# Patient Record
Sex: FEMALE | Race: BLACK OR AFRICAN AMERICAN | NOT HISPANIC OR LATINO | Employment: OTHER | URBAN - METROPOLITAN AREA
[De-identification: names, ages, dates, MRNs, and addresses within clinical notes are randomized per-mention and may not be internally consistent; named-entity substitution may affect disease eponyms.]

---

## 2017-01-09 ENCOUNTER — OFFICE VISIT (OUTPATIENT)
Dept: OBSTETRICS AND GYNECOLOGY | Facility: CLINIC | Age: 62
End: 2017-01-09
Payer: MEDICARE

## 2017-01-09 VITALS
BODY MASS INDEX: 26.36 KG/M2 | SYSTOLIC BLOOD PRESSURE: 132 MMHG | DIASTOLIC BLOOD PRESSURE: 78 MMHG | WEIGHT: 144.13 LBS

## 2017-01-09 DIAGNOSIS — N76.0 ACUTE VAGINITIS: ICD-10-CM

## 2017-01-09 DIAGNOSIS — Z78.0 POSTMENOPAUSAL STATUS: ICD-10-CM

## 2017-01-09 DIAGNOSIS — Z76.82 KIDNEY TRANSPLANT CANDIDATE: ICD-10-CM

## 2017-01-09 DIAGNOSIS — Z01.419 WELL WOMAN EXAM WITH ROUTINE GYNECOLOGICAL EXAM: Primary | ICD-10-CM

## 2017-01-09 DIAGNOSIS — Z12.4 PAP SMEAR FOR CERVICAL CANCER SCREENING: ICD-10-CM

## 2017-01-09 DIAGNOSIS — Z85.3 PERSONAL HISTORY OF BREAST CANCER: ICD-10-CM

## 2017-01-09 DIAGNOSIS — Z90.710 HISTORY OF HYSTERECTOMY: ICD-10-CM

## 2017-01-09 PROCEDURE — G0101 CA SCREEN;PELVIC/BREAST EXAM: HCPCS | Mod: S$PBB,,, | Performed by: OBSTETRICS & GYNECOLOGY

## 2017-01-09 PROCEDURE — 88175 CYTOPATH C/V AUTO FLUID REDO: CPT

## 2017-01-09 PROCEDURE — G0101 CA SCREEN;PELVIC/BREAST EXAM: HCPCS | Mod: PBBFAC | Performed by: OBSTETRICS & GYNECOLOGY

## 2017-01-09 PROCEDURE — 99999 PR PBB SHADOW E&M-EST. PATIENT-LVL II: CPT | Mod: PBBFAC,,, | Performed by: OBSTETRICS & GYNECOLOGY

## 2017-01-09 PROCEDURE — 87480 CANDIDA DNA DIR PROBE: CPT

## 2017-01-09 PROCEDURE — 99212 OFFICE O/P EST SF 10 MIN: CPT | Mod: PBBFAC,25 | Performed by: OBSTETRICS & GYNECOLOGY

## 2017-01-09 NOTE — PROGRESS NOTES
Archana Ward is a 61 y.o. year old  who presents for annual exam.  She is S/P JULIANA / BSO.  No bleeding.  No on ERT.  Reports having increased discharge off and on with itching, irritation.  She has a history of left DCIS, S/P left partial mastectomy without sentinel node biopsy on 2015 and subsequent radiation.  She has ESRD on dialysis and is a kidney transplant candidate.      Past Medical History   Diagnosis Date    Anemia of chronic renal failure 2014    Breast cancer     ESRD 2/2 HTN on HD since 11    Hypertension - Dx in 30s 2014    Kidney transplant candidate 2014    Secondary hyperparathyroidism of renal origin 2014       Past Surgical History   Procedure Laterality Date    Cholecystectomy      Av fistula placement      Hysterectomy         OB History      Para Term  AB TAB SAB Ectopic Multiple Living    6 6 6               Obstetric Comments    Normal delivery for all 6 pregnancies  No h/o gestational diabetes or preeclampsia          ROS:  GENERAL: Feeling well overall.   SKIN: Denies rash or lesions.   HEAD: Denies head injury or headache.   NODES: Denies enlarged lymph nodes.   CHEST: Denies chest pain or shortness of breath.   CARDIOVASCULAR: Denies palpitations or left sided chest pain.   ABDOMEN: No abdominal pain.  Reports some constipation.   URINARY: No dysuria or hematuria.  REPRODUCTIVE: See HPI.   BREASTS: Denies pain, lumps, or nipple discharge.   HEMATOLOGIC: No easy bruisability or excessive bleeding.   MUSCULOSKELETAL: Reports some discomfort in knees.  NEUROLOGIC: Denies syncope or weakness.   PSYCHIATRIC: Denies depression.    PE:  (chaperone present during entire exam)  APPEARANCE: Well nourished, well developed, in no acute distress.  NODES: No inguinal lymph node enlargement.  ABDOMEN: Soft. No tenderness or masses. No hernias.  BREASTS: Symmetrical.  Scarring left breast.  No palpable masses,  nipple discharge or adenopathy bilaterally.  PELVIC: Atrophic external female genitalia without lesions. Normal hair distribution. Adequate perineal body, normal urethral meatus. Vagina atrophic without lesions. Mild amount of thin white discharge. Mild cystocele and rectocele. Uterus and cervix surgically absent. Bimanual exam revealed no masses, tenderness or abnormality.  ANUS: Normal.    Diagnosis:  1. Well woman exam with routine gynecological exam    2. Postmenopausal status    3. History of hysterectomy    4. Kidney transplant candidate    5. Pap smear for cervical cancer screening    6. Acute vaginitis    7. Personal history of breast cancer          PLAN:    Orders Placed This Encounter    Vaginosis Screen by DNA Probe    Liquid-based pap smear, screening       Patient was counseled today on postmenopausal issues.  We discussed vaginitis and will contact her with results of the Affirm.  Mammogram 6/2017.     Follow-up in 1 year.

## 2017-01-10 ENCOUNTER — TELEPHONE (OUTPATIENT)
Dept: OBSTETRICS AND GYNECOLOGY | Facility: CLINIC | Age: 62
End: 2017-01-10

## 2017-01-10 LAB
CANDIDA RRNA VAG QL PROBE: NEGATIVE
G VAGINALIS RRNA GENITAL QL PROBE: POSITIVE
T VAGINALIS RRNA GENITAL QL PROBE: NEGATIVE

## 2017-01-10 RX ORDER — METRONIDAZOLE 500 MG/1
500 TABLET ORAL 2 TIMES DAILY
Qty: 14 TABLET | Refills: 0 | Status: SHIPPED | OUTPATIENT
Start: 2017-01-10 | End: 2017-01-17

## 2017-01-10 NOTE — TELEPHONE ENCOUNTER
Called patient:    Discussed results of Affirm:  +BV    Rx: Flagyl 500 mg bid x 7 days.  No alcohol.    To call if not resolved.

## 2017-02-14 ENCOUNTER — OFFICE VISIT (OUTPATIENT)
Dept: PSYCHIATRY | Facility: CLINIC | Age: 62
End: 2017-02-14
Payer: MEDICARE

## 2017-02-14 DIAGNOSIS — Z76.82 ORGAN TRANSPLANT CANDIDATE: ICD-10-CM

## 2017-02-14 DIAGNOSIS — R41.3 MEMORY LOSS: ICD-10-CM

## 2017-02-14 DIAGNOSIS — N18.6 ESRD (END STAGE RENAL DISEASE): ICD-10-CM

## 2017-02-14 DIAGNOSIS — R41.89 COGNITIVE IMPAIRMENT: Primary | ICD-10-CM

## 2017-02-14 PROCEDURE — 96119 PR NEUROPSYCH TESTING BY TECHNICIAN: CPT | Mod: 59,S$PBB,, | Performed by: PSYCHOLOGIST

## 2017-02-14 PROCEDURE — 90791 PSYCH DIAGNOSTIC EVALUATION: CPT | Mod: S$PBB,,, | Performed by: PSYCHOLOGIST

## 2017-02-14 PROCEDURE — 96118 PR NEUROPSYCH TESTING BY PSYCH/PHYS: CPT | Mod: 59,S$PBB,, | Performed by: PSYCHOLOGIST

## 2017-02-20 ENCOUNTER — TELEPHONE (OUTPATIENT)
Dept: PSYCHIATRY | Facility: CLINIC | Age: 62
End: 2017-02-20

## 2017-02-20 NOTE — PROGRESS NOTES
Psychiatry Initial Visit (PhD/LCSW)    Date:   2/14/2017               CPT Code: 98329    Referred by: Laureen Dill NP    Chief complaint/reason for encounter:  Neuropsychological Evaluation    Clinical status of patient:  Outpatient    Met with:  Patient    History of present illness: Mrs. Archana Ward is a 61 year old separate  female referred for Neuropsychological Evaluation on an outpatient basis as part of the evaluation prior to kidney transplant. She reported to the kidney transplant staff that sometimes she does not take her medication because of memory difficulty, prompting this evaluation. She reported that subjective memory decline has been present for a few months. She reported that she will be doing something, then forget what she as doing. She reported she has trouble with her memory sometimes but not too much. Upon direct questioning, she also reported that she misplaces personal items in the home; forgets conversations and events; repeats herself per her daughter; forgets what she reads; has left food cooking on the stove; has forgotten to pay bills; and loses her train of thought in conversation. She does not drive as she never learned how. She reported she manages her own medications and finances adequately although she will forget a medication or a bill now and then. She has no difficulty with household chores. She reported her memory seems to be staying the same over time. No precipitant could be identified. There is no family history of memory problems/dementia. Mrs. Ward has no prior psychiatric history. She also denied current adjustment problems. She was pleasant and cooperative in interview. She has been on dialysis for about 4 years and would very much like to get a transplant.    Pain scale: noncontributory    Symptoms:  Mood: denied  Anxiety:  denied  Substance abuse: denied  Cognitive functioning:  memory decline    Psychiatric history: none    Medical history:  memory loss, organ transplant candidate, ESRD, anemia of chronic renal failure, ductal carcinoma in situ, and essential hypertension. No brain/head imaging was available.    Family history of psychiatric illness: none    Social history (marriage, employment, etc.):  10th grade education. 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  once.  for 40 years. 6 children. Lives with a daughter and her 3 sons. [employer]. Active in Buddhism. Enjoys watching TV, cooking for grandchildren.      Substance use:   Alcohol: no   Drugs: no   Tobacco: no   Caffeine: no    Strengths and Liabilities:   Strength:  Pt is expressive/articulate.   Liability:  Pt has subjective memory loss.    Mental Status Exam:  General appearance:  appears stated age, neatly dressed, well groomed  Speech:  normal rate and tone  Level of cooperation:  cooperative  Thought processes:  logical, goal-directed  Mood:  euthymic  Thought content:  no illusions, no visual hallucinations, no auditory hallucinations, no delusions, no active or passive homicidal thoughts, no active or passive suicidal ideation, no obsessions, no compulsions, no violence  Affect:  appropriate  Orientation:  oriented to person, place, and time  Memory:  Recent memory:  2 of 3 objects after brief delay.    Remote memory - intact  Attention span and concentration:  spelled HOUSE forward and backwards  Fund of general knowledge: 4 of 4 recent presidents  Abstract reasoning:    Similarities: concrete  Proverbs: dont know  Judgment and insight: fair  Language:  intact    Diagnostic impressions:  Cognitive impairment R41.89  Memory loss R41.3  ESRD N18.6  Organ transplant candidate Z76.82    Plan:  Pt will complete Neuropsychological testing.  Report of Neuropsychological Evaluation will follow. It can be accessed through the Chart Review activity in Epic under the Notes tab.  It will be titled Psych Testing.    Return to clinic:  as  scheduled    Length of time:  40 minutes

## 2017-02-20 NOTE — PSYCH TESTING
[facility] [address] [place]  [address]  [phone]    REPORT OF NEUROPSYCHOLOGICAL EVALUATION    NAME: Archana Ward  OC #: [id number]  : 1955    REFERRED BY: Laureen Dill NP    EVALUATED BY:  Dolores Powers, Ph.D., Clinical Psychologist  Parish Urbina M.S., Psychometrician    DATE OF EVALUATION: 2017    EVALUATION PROCEDURES AND TIME:  Conducted by Psychologist:  Interpretation and report of test data  Review and integration of diagnostic interview, medical record, and test data  Conducted by Technician:  Repeatable Battery for the Assessment of Neuropsychological Status (RBANS)  Temporal Orientation Test  Naming Test from the Neuropsychological Assessment Battery (NAB)  Controlled Oral Word Association Test  Facial Recognition Test  Guthrie Visual Motor Gestalt Test  Wechsler Memory Scale IV Logical Memory & Visual Reproduction Battery (WMS-IV LMVR)  Wisconsin Card Sorting Test - 64 (WCST-64)  Trail Making Test, Parts A & B  Time: CPT Code 80778 - 2 hours; CPT Code 50718 - 2 hours    EVALUATION FINDINGS:  The diagnostic interview revealed Mrs. Archana Ward is a 61 year old right-handed -American female referred for Neuropsychological Evaluation on an outpatient basis as part of the evaluation prior to kidney transplant. She reported to the kidney transplant staff that sometimes she does not take her medication because of memory difficulty, prompting this evaluation. She reported that subjective memory decline has been present for a few months. She reported that she will be doing something, then forget what she as doing. She reported she has trouble with her memory sometimes but not too much. Upon direct questioning, she also reported that she misplaces personal items in the home; forgets conversations and events; repeats herself per her daughter; forgets what she reads; has left food cooking on the stove; has forgotten to pay bills; and loses her  train of thought in conversation. She does not drive as she never learned how. She reported she manages her own medications and finances adequately although she will forget a medication or a bill now and then. She has no difficulty with household chores. She reported her memory seems to be staying the same over time. No precipitant could be identified. There is no family history of memory problems/dementia. Mrs. Ward has no prior psychiatric history. She also denied current adjustment problems. She was pleasant and cooperative in interview. She has been on dialysis for about 4 years and would very much like to get a transplant. The Mental Status Exam suggested reduced recent memory and abstraction ability.    The medical record also revealed prior medical history of memory loss, organ transplant candidate, ESRD, anemia of chronic renal failure, ductal carcinoma in situ, and essential hypertension. No brain/head imaging was available.      TEST DATA:  Neuropsychological tests administered by the technician revealed that the patient reported she has a 10th grade education. 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  She was alert and cooperative during the evaluation.  Effort on all tests was satisfactory to produce valid results.    Mrs. Ward obtained a total score of 68 on the RBANS, which is at the 2nd percentile, suggesting moderate impairment in general cognitive functioning.  Five areas were tested.  The Immediate Memory Index revealed moderate impairment in the ability to remember verbal information immediately after it is presented, with a score of 65 at the 1st percentile.  The Visuospatial/Constructional Index revealed average ability to perceive spatial relations and to construct a spatially accurate copy of a drawing, with a score of 92 at the 30th percentile. Visuospatial perception and constructional ability were average. The Language Index revealed moderate  impairment in the ability to respond verbally to either naming or retrieving learned material, with a score of 68 at the 2nd percentile. Naming and verbal fluency were moderately impaired. Attention, or the capacity to remember and manipulate both visually and orally presented information in short-term storage, was moderately impaired, with a score of 68 at the 2nd percentile. Delayed memory, or anterograde memory capacity, was mildly impaired, with a score of 81 at the 10th percentile.  Subtest performances revealed average figure recall, mildly impaired list recognition and story recall, and moderately impaired list recall. For reference, the expected average score on each index is 100, which is at the 50th percentile.    The Temporal Orientation Test indicated she was oriented to day of the week, day of the month, month, and year but not to time of day (1 hour off).  Her score on this measure suggested average temporal orientation.    Naming, as assessed by the NAB Test, was mildly to moderately impaired.  Verbal fluency, as assessed by the Controlled Oral Word Association Test, was in the average range.    Performance on the Facial Recognition Test suggested no prosopagnosia was present, as her score was in the very superior range.  Reproductions of Guthrie Visual Motor Gestalt Test designs revealed mild constructional dyspraxia.    The WMS-IV LMVR was administered to further assess memory.  Her Immediate Memory Index of 80 was in the mildly impaired range and her Delayed Memory Index of 70 was in the moderately impaired range. Her Auditory Memory Index of 75 was in the moderately impaired range and her Visual Memory Index of 82 was in the mildly impaired range. The expected average score for each index is 100. Scaled scores of the memory indexes revealed mildly impaired immediate auditory memory, moderately impaired delayed auditory memory, and mildly impaired immediate and delayed visual memory.    The WCST-64  was administered to assess abstract reasoning and conceptualization.  Her categories completed score (0) was in the mildly to moderately impaired range. Her total errors score (42) was in the moderately impaired range and her perseverative errors score (18) was in the below average range.  Her performance suggested mildly to moderately impaired abstract reasoning skills.    Her score on the Trail Making Test, Part A, (64 seconds for completion) indicated that psychomotor speed was in the moderately impaired range.  Her score on Part B (175 seconds for completion) indicated that her ability to handle complex relationships which require rapid change of set, or mental flexibility, was in the moderately impaired range.    SUMMARY AND RECOMMENDATIONS:  Mrs. Ward was referred for Neuropsychological Evaluation on an outpatient basis as part of the evaluation prior to kidney transplant.  Her general cognitive abilities as assessed by the RBANS were in the moderately impaired range, with average visuospatial/constructional abilities; mildly impaired delayed memory; and moderately impaired immediate verbal memory, language, and attention.  Further assessment of specific cognitive abilities revealed no deficits in temporal orientation, verbal fluency, and facial recognition; but naming, constructional ability, abstract reasoning, psychomotor speed, and mental flexibility were impaired.  Additional memory assessment revealed mildly impaired immediate auditory memory, moderately impaired delayed auditory memory, and mildly impaired immediate and delayed visual memory.  Neuropsychological testing reveals impairment in immediate and delayed auditory/verbal memory, immediate visual memory, attention, naming, abstract reasoning, psychomotor speed, and mental flexibility; and variability in delayed visual memory, verbal fluency, and constructional ability (average and impaired performances in each area). Test data suggest mild  cognitive impairment, bordering on dementia. Evaluation results which reveal impairment in immediate and delayed auditory/verbal memory, immediate visual memory, and attention and variability in delayed visual memory indicate that Mrs. Ward is currently unable to reliably manage her own health care independently and requires supervision from family or other caregivers to assure her medical compliance, safety, and well-being. Consideration could be given to a referral to Neurology for further work-up of cognitive impairment.      Interpretation and report and coding were completed on 2/20/2017.

## 2017-07-10 ENCOUNTER — OFFICE VISIT (OUTPATIENT)
Dept: SURGERY | Facility: CLINIC | Age: 62
End: 2017-07-10
Payer: MEDICARE

## 2017-07-10 ENCOUNTER — HOSPITAL ENCOUNTER (OUTPATIENT)
Dept: RADIOLOGY | Facility: HOSPITAL | Age: 62
Discharge: HOME | End: 2017-07-10
Attending: SURGERY
Payer: MEDICARE

## 2017-07-10 VITALS
HEIGHT: 62 IN | DIASTOLIC BLOOD PRESSURE: 68 MMHG | TEMPERATURE: 98 F | BODY MASS INDEX: 26.5 KG/M2 | WEIGHT: 144.38 LBS | WEIGHT: 144 LBS | HEART RATE: 75 BPM | HEIGHT: 62 IN | SYSTOLIC BLOOD PRESSURE: 120 MMHG | BODY MASS INDEX: 26.57 KG/M2

## 2017-07-10 DIAGNOSIS — C50.912 MALIGNANT NEOPLASM OF LEFT FEMALE BREAST: ICD-10-CM

## 2017-07-10 DIAGNOSIS — Z85.3 PERSONAL HISTORY OF BREAST CANCER: Primary | ICD-10-CM

## 2017-07-10 PROCEDURE — 99999 PR PBB SHADOW E&M-EST. PATIENT-LVL III: CPT | Mod: PBBFAC,TXP,, | Performed by: SURGERY

## 2017-07-10 PROCEDURE — 77066 DX MAMMO INCL CAD BI: CPT | Mod: 26,NTX,, | Performed by: RADIOLOGY

## 2017-07-10 PROCEDURE — 99213 OFFICE O/P EST LOW 20 MIN: CPT | Mod: PBBFAC,PO,TXP | Performed by: SURGERY

## 2017-07-10 PROCEDURE — 77062 BREAST TOMOSYNTHESIS BI: CPT | Mod: 26,NTX,, | Performed by: RADIOLOGY

## 2017-07-10 PROCEDURE — 99213 OFFICE O/P EST LOW 20 MIN: CPT | Mod: S$PBB,NTX,, | Performed by: SURGERY

## 2017-07-10 RX ORDER — CALCIUM ACETATE 667 MG/1
CAPSULE ORAL
Refills: 6 | Status: ON HOLD | COMMUNITY
Start: 2017-07-07 | End: 2021-12-07 | Stop reason: HOSPADM

## 2017-07-10 NOTE — PROGRESS NOTES
"Date of Service:7/10/2017      DIAGNOSIS:   This is a 62 y.o. female with a history of stage 0 Tis (3 cm) NxMx, grade 3, ER -, [place] - DCIS of the left breast.    TREATMENT:   1. Left partial mastectomy without sentinel node biopsy on 11/4/2015. Rachel Saldana M.D. Surgical Oncology  2. reexcision left breast 12/16/2015. Rachel Saldana, Surgical Oncology.  3. Radiation therapy from 2/2016  To 3/2016. Lucy Arevalo M.D. Radiation Oncology       HISTORY OF PRESENT ILLNESS:   Archana Ward is a 62 y.o. female comes in for oncological follow up. She denies any changes to her breasts over the pasts year aside from some dry skin over her left breast. She denies any nipple retraction, skin dimpling or new masses. She denies any fevers, chills, night sweats or weight loss. There is no new family history    IMAGING:   Bilateral MMG today negative     MEDICATIONS/ALLERGIES:     No Known Allergies    PHYSICAL EXAM:     /68 (BP Location: Right arm, Patient Position: Sitting, BP Method: Automatic)   Pulse 75   Temp 98.1 °F (36.7 °C) (Oral)   Ht 5' 2" (1.575 m)   Wt 65.5 kg (144 lb 6.4 oz)   LMP  (Approximate)   BMI 26.41 kg/m²   General: The patient appears well and is in no acute distress.   Neuro: Alert & oriented x3. HEENT: PERRLA, EOMI, sclerae nonicteric. Mucous membranes moist.   Cardiovascular: RRR, no g/r/m.  Breasts: The exam was done with the patient seated and supine. Left breast - well healed scar. within normal limits. No palpable masses and no abnormal skin or nipple findings. No supraclavicular or axillary lymphadenopathy on the left side. Some hyperpigmentation from radiation, good cosmesis.  Right breast - within normal limits. No palpable masses or nipple findings. Left breast smaller. Area of dry skin over left breast surgical scar. No supraclavicular or axillary lymphadenopathy on the right side.  Pulmonary: clear to auscultation bilaterally   Abdomen: soft, nontender, nondistended. No masses.  "   Extremities: No clubbing or cyanosis. Bilateral full arm range of motion without  lymphedema.     ASSESSMENT:   This is a 62 y.o. female without evidence of recurrence by exam, history or imaging.       PLAN:   1. Continue to follow up with me.  2. Continue monthly self breast exams and call the clinic with any changes or problems.  3. Mammogram in June 2018 .  4. Return to clinic in 6 months. The patient is in agreement with the plan. Questions were encouraged and answered to patient's satisfaction. Archana will call our office with any questions or concerns.   5. Rx for special bra padding given

## 2017-07-21 ENCOUNTER — LAB VISIT (OUTPATIENT)
Dept: LAB | Facility: HOSPITAL | Age: 62
End: 2017-07-21
Payer: MEDICARE

## 2017-07-21 DIAGNOSIS — Z76.82 ORGAN TRANSPLANT CANDIDATE: ICD-10-CM

## 2017-07-21 PROCEDURE — 86832 HLA CLASS I HIGH DEFIN QUAL: CPT | Mod: PO,TXP

## 2017-07-21 PROCEDURE — 86833 HLA CLASS II HIGH DEFIN QUAL: CPT | Mod: PO,TXP

## 2017-07-31 DIAGNOSIS — Z76.82 ORGAN TRANSPLANT CANDIDATE: ICD-10-CM

## 2017-08-19 LAB — HPRA INTERPRETATION: NORMAL

## 2017-09-05 ENCOUNTER — TELEPHONE (OUTPATIENT)
Dept: SURGERY | Facility: CLINIC | Age: 62
End: 2017-09-05

## 2017-09-05 NOTE — TELEPHONE ENCOUNTER
----- Message from Frank Alfaro sent at 9/5/2017 12:30 PM CDT -----  Contact: Yvonne Barros// [facility]  Caller states that (s)he needs to speak with nurse in ref to getting the pts clinic notes from 07/10 visit faxed over//please call back at [phone]//thank you    Fax; [phone]

## 2017-09-09 LAB
CLASS I ANTIBODIES - LUMINEX: NORMAL
CLASS I ANTIBODY COMMENTS - LUMINEX: NORMAL
CLASS II ANTIBODIES - LUMINEX: NORMAL
CLASS II ANTIBODY COMMENTS - LUMINEX: NORMAL
CPRA %: 98
SERUM COLLECTION DT - LUMINEX CLASS I: NORMAL
SERUM COLLECTION DT - LUMINEX CLASS II: NORMAL
SPCL1 TESTING DATE: NORMAL
SPCL2 TESTING DATE: NORMAL
SPCLU TESTING DATE: NORMAL

## 2017-09-15 DIAGNOSIS — Z85.3 PERSONAL HISTORY OF BREAST CANCER: Primary | ICD-10-CM

## 2017-10-27 ENCOUNTER — LAB VISIT (OUTPATIENT)
Dept: LAB | Facility: HOSPITAL | Age: 62
End: 2017-10-27
Payer: MEDICARE

## 2017-10-27 DIAGNOSIS — Z76.82 ORGAN TRANSPLANT CANDIDATE: ICD-10-CM

## 2017-10-27 PROCEDURE — 86833 HLA CLASS II HIGH DEFIN QUAL: CPT | Mod: PO,TXP

## 2017-10-27 PROCEDURE — 86832 HLA CLASS I HIGH DEFIN QUAL: CPT | Mod: PO,TXP

## 2017-11-15 LAB — HPRA INTERPRETATION: NORMAL

## 2017-11-20 LAB
CLASS I ANTIBODIES - LUMINEX: NORMAL
CLASS I ANTIBODY COMMENTS - LUMINEX: NORMAL
CLASS II ANTIBODY COMMENTS - LUMINEX: NORMAL
CPRA %: 95
SERUM COLLECTION DT - LUMINEX CLASS I: NORMAL
SERUM COLLECTION DT - LUMINEX CLASS II: NORMAL
SPCL1 TESTING DATE: NORMAL
SPCL2 TESTING DATE: NORMAL
SPCLU TESTING DATE: NORMAL

## 2017-12-15 DIAGNOSIS — Z76.82 AWAITING ORGAN TRANSPLANT STATUS: Primary | ICD-10-CM

## 2018-01-22 ENCOUNTER — TELEPHONE (OUTPATIENT)
Dept: SURGERY | Facility: CLINIC | Age: 63
End: 2018-01-22

## 2018-01-22 ENCOUNTER — OFFICE VISIT (OUTPATIENT)
Dept: SURGERY | Facility: CLINIC | Age: 63
End: 2018-01-22
Payer: MEDICARE

## 2018-01-22 VITALS
HEART RATE: 75 BPM | TEMPERATURE: 97 F | SYSTOLIC BLOOD PRESSURE: 132 MMHG | HEIGHT: 62 IN | DIASTOLIC BLOOD PRESSURE: 67 MMHG | BODY MASS INDEX: 29.6 KG/M2 | WEIGHT: 160.88 LBS

## 2018-01-22 DIAGNOSIS — Z85.3 PERSONAL HISTORY OF BREAST CANCER: Primary | ICD-10-CM

## 2018-01-22 PROCEDURE — 99213 OFFICE O/P EST LOW 20 MIN: CPT | Mod: PBBFAC,PO,TXP | Performed by: SURGERY

## 2018-01-22 PROCEDURE — 99999 PR PBB SHADOW E&M-EST. PATIENT-LVL III: CPT | Mod: PBBFAC,TXP,, | Performed by: SURGERY

## 2018-01-22 PROCEDURE — 99213 OFFICE O/P EST LOW 20 MIN: CPT | Mod: S$PBB,NTX,, | Performed by: SURGERY

## 2018-01-22 NOTE — PROGRESS NOTES
"Date of Service:1/22/2018      DIAGNOSIS:   This is a 62 y.o. female with a history of stage 0 Tis (3 cm) NxMx, grade 3, ER -, [place] - DCIS of the left breast.    TREATMENT:   1. Left partial mastectomy without sentinel node biopsy on 11/4/2015. Rachel Saldana M.D. Surgical Oncology  2. reexcision left breast 12/16/2015. Rachel Saldana, Surgical Oncology.  3. Radiation therapy from 2/2016  To 3/2016. Lucy Arevalo M.D. Radiation Oncology       HISTORY OF PRESENT ILLNESS:   Archana Ward is a 62 y.o. female comes in for oncological follow up. She denies any changes to her breasts over the pasts year aside from some dry skin over her left breast. She denies any nipple retraction, skin dimpling or new masses. She denies any fevers, chills, night sweats or weight loss. There is no new family history    IMAGING:   Due july 2018     MEDICATIONS/ALLERGIES:     No Known Allergies    PHYSICAL EXAM:     /67 (BP Location: Right arm, Patient Position: Sitting, BP Method: Medium (Automatic))   Pulse 75   Temp 97.3 °F (36.3 °C) (Oral)   Ht 5' 2" (1.575 m)   Wt 73 kg (160 lb 14.4 oz)   BMI 29.43 kg/m²   General: The patient appears well and is in no acute distress.   Neuro: Alert & oriented x3. HEENT: PERRLA, EOMI, sclerae nonicteric. Mucous membranes moist.   Cardiovascular: RRR, no g/r/m.  Breasts: The exam was done with the patient seated and supine. Left breast - well healed scar. within normal limits. No palpable masses and no abnormal skin or nipple findings. No supraclavicular or axillary lymphadenopathy on the left side. Some hyperpigmentation from radiation, good cosmesis.  Right breast - within normal limits. No palpable masses or nipple findings. Left breast smaller. Area of dry skin over left breast surgical scar. No supraclavicular or axillary lymphadenopathy on the right side.  Pulmonary: clear to auscultation bilaterally   Abdomen: soft, nontender, nondistended. No masses.    Extremities: No clubbing or " cyanosis. Bilateral full arm range of motion without  lymphedema.     ASSESSMENT:   This is a 62 y.o. female without evidence of recurrence by exam, history or imaging.       PLAN:   1. Continue to follow up with me.  2. Continue monthly self breast exams and call the clinic with any changes or problems.  3. Mammogram in July 2018 .  4. Return to clinic in 6 months. The patient is in agreement with the plan. Questions were encouraged and answered to patient's satisfaction. Archana will call our office with any questions or concerns.

## 2018-01-22 NOTE — TELEPHONE ENCOUNTER
Returned call to patient, pt's transportation company is running behind x1 hr, appointment rescheduled and confirmed for 3:30 pm, pt unable to come on another day rt dialysis, spoke with transportation company and stated she would be to appointment at 3:30 pm

## 2018-01-22 NOTE — TELEPHONE ENCOUNTER
----- Message from Dayan Bell sent at 1/22/2018  1:38 PM CST -----  Contact: Pt  Pt says she is running late    Pt is requesting a callback at [phone]    Thanks

## 2018-02-22 LAB — EXT PTH, INTACT: 1075

## 2018-03-12 ENCOUNTER — LAB VISIT (OUTPATIENT)
Dept: LAB | Facility: HOSPITAL | Age: 63
End: 2018-03-12
Payer: MEDICARE

## 2018-03-12 DIAGNOSIS — Z76.82 ORGAN TRANSPLANT CANDIDATE: Primary | ICD-10-CM

## 2018-03-12 DIAGNOSIS — Z76.82 AWAITING ORGAN TRANSPLANT STATUS: ICD-10-CM

## 2018-03-12 PROCEDURE — 86833 HLA CLASS II HIGH DEFIN QUAL: CPT | Mod: PO,TXP

## 2018-03-12 PROCEDURE — 86832 HLA CLASS I HIGH DEFIN QUAL: CPT | Mod: PO,TXP

## 2018-03-12 NOTE — LETTER
Date: 3/12/2018          Listed Patient      To: Dialysis Unit  and Charge RN From: [facility] Workers and      Kidney Transplant Nurse Coordinators    RE: Archana Lalo Sylvia, 1955, [id number]     At [facility], we conduct adherence checks as an important part of transplant care. Initial and listed patient assessments are not complete without adherence information.        Please complete the following information:     Current Dry Weight: ___________         Most Recent Pre-Treatment Weight: ___________ /date: _________                    Data from the last  3 months:  (data from last 3 months preferred):    Number of AMAs with dates, time, and reasons: ____________________________________________________    ______________________________________________________________________________________________    ______________________________________________________________________________________________    Number of No-Shows with dates and reasons: ______________________________________________________      ______________________________________________________________________________________________    Last intact PTH:  ___________/date: __________      Any concerns with Labs:  YES / NO      If yes, please explain:  ___________________________________________________________________________    ______________________________________________________________________________________________    Any concerns with Caregivers:  YES / NO    If yes, please explain:  ___________________________________________________________________________    ______________________________________________________________________________________________     Any concerns with Transportation:  YES / NO    If yes, please explain:   ___________________________________________________________________________    ______________________________________________________________________________________________    Any Psychiatric and/or Psychosocial concerns:  YES / NO     If yes, please explain: ___________________________________________________________________________    ______________________________________________________________________________________________      PLEASE RETURN TO: FAX: [phone]     Thank you for collaborating with us in the care of this patient.           [address]  ?  [place] [address]  ?  phone [phone]  ?  fax [phone]  ?  [email or web address]  Confidentiality notice: The accompanying facsimile is intended solely for the use of the recipient designated above. Document(s) transmitted herewith may contain information that is confidential and privileged. Delivery, distribution or dissemination of this communication other than to the intended recipient is strictly prohibited. If you have received this facsimile in error, please notify us immediately by telephone.

## 2018-03-15 LAB — HPRA INTERPRETATION: NORMAL

## 2018-03-22 LAB — EXT PTH, INTACT: 1000

## 2018-03-27 LAB
CLASS I ANTIBODIES - LUMINEX: NORMAL
CLASS I ANTIBODY COMMENTS - LUMINEX: NORMAL
CLASS II ANTIBODIES - LUMINEX: NORMAL
CLASS II ANTIBODY COMMENTS - LUMINEX: NORMAL
CPRA %: 97
SERUM COLLECTION DT - LUMINEX CLASS I: NORMAL
SERUM COLLECTION DT - LUMINEX CLASS II: NORMAL
SPCL1 TESTING DATE: NORMAL
SPCL2 TESTING DATE: NORMAL
SPCLU TESTING DATE: NORMAL

## 2018-04-25 ENCOUNTER — HOSPITAL ENCOUNTER (OUTPATIENT)
Dept: CARDIOLOGY | Facility: HOSPITAL | Age: 63
Discharge: HOME | End: 2018-04-25
Attending: NURSE PRACTITIONER
Payer: MEDICARE

## 2018-04-25 ENCOUNTER — HOSPITAL ENCOUNTER (OUTPATIENT)
Dept: RADIOLOGY | Facility: HOSPITAL | Age: 63
Discharge: HOME | End: 2018-04-25
Attending: NURSE PRACTITIONER
Payer: MEDICARE

## 2018-04-25 ENCOUNTER — TELEPHONE (OUTPATIENT)
Dept: TRANSPLANT | Facility: CLINIC | Age: 63
End: 2018-04-25

## 2018-04-25 DIAGNOSIS — Z76.82 ORGAN TRANSPLANT CANDIDATE: ICD-10-CM

## 2018-04-25 LAB
DIASTOLIC DYSFUNCTION: NO
ESTIMATED PA SYSTOLIC PRESSURE: 44.24
GLOBAL PERICARDIAL EFFUSION: ABNORMAL
MITRAL VALVE MOBILITY: NORMAL
MITRAL VALVE REGURGITATION: ABNORMAL
RETIRED EF AND QEF - SEE NOTES: 55 (ref 55–65)
TRICUSPID VALVE REGURGITATION: ABNORMAL

## 2018-04-25 PROCEDURE — 93306 TTE W/DOPPLER COMPLETE: CPT | Mod: 26,TXP,, | Performed by: INTERNAL MEDICINE

## 2018-04-25 PROCEDURE — 71046 X-RAY EXAM CHEST 2 VIEWS: CPT | Mod: TC,FY,TXP

## 2018-04-25 PROCEDURE — 78452 HT MUSCLE IMAGE SPECT MULT: CPT | Mod: 26,TXP,, | Performed by: INTERNAL MEDICINE

## 2018-04-25 PROCEDURE — 63600175 PHARM REV CODE 636 W HCPCS: Mod: TXP

## 2018-04-25 PROCEDURE — 71046 X-RAY EXAM CHEST 2 VIEWS: CPT | Mod: 26,TXP,, | Performed by: RADIOLOGY

## 2018-04-25 PROCEDURE — A9502 TC99M TETROFOSMIN: HCPCS | Mod: TXP

## 2018-04-25 PROCEDURE — 93306 TTE W/DOPPLER COMPLETE: CPT | Mod: TXP

## 2018-04-25 PROCEDURE — 93018 CV STRESS TEST I&R ONLY: CPT | Mod: TXP,,, | Performed by: INTERNAL MEDICINE

## 2018-04-25 PROCEDURE — 93017 CV STRESS TEST TRACING ONLY: CPT | Mod: TXP

## 2018-04-25 PROCEDURE — 93016 CV STRESS TEST SUPVJ ONLY: CPT | Mod: TXP,,, | Performed by: INTERNAL MEDICINE

## 2018-04-25 RX ORDER — REGADENOSON 0.08 MG/ML
INJECTION, SOLUTION INTRAVENOUS
Status: DISPENSED
Start: 2018-04-25 | End: 2018-04-25

## 2018-04-25 NOTE — TELEPHONE ENCOUNTER
----- Message from Laureen Dill NP sent at 4/25/2018  3:42 PM CDT -----  Dr Booth,   Glad to hear the stress test is acceptable. I don't think you need to see the patient unless there is a medical reason  or change that needs to be addressed.     Thanks ,  Laureen      ----- Message -----  From: Jsoe Booth MD  Sent: 4/25/2018   1:00 PM  To: SUBHA Dean Sorry for the multiple messages.  I took another look at Ms. Ward's stress test after reading her echo (which was normal), and had the stress test reprocessed.  It now looks much better with only an apical attenuation artifact (no ischemia/scar).  I'm still happy to see her in consult for a preop eval if you think that is necessary.    Sorry about the multiple messages.    Best,  Jose Booth MD, FACC  [facility]

## 2018-04-30 ENCOUNTER — TELEPHONE (OUTPATIENT)
Dept: TRANSPLANT | Facility: CLINIC | Age: 63
End: 2018-04-30

## 2018-04-30 NOTE — TELEPHONE ENCOUNTER
Left callback information with , Zarina not available      ----- Message from Gauri Chappell sent at 4/30/2018  8:49 AM CDT -----  Contact: Zarina Serrato      ----- Message -----  From: Orly Jewell  Sent: 4/30/2018   8:41 AM  To: [facility]    Calling to get status of pt on list    Pt contact [phone]

## 2018-05-09 ENCOUNTER — LAB VISIT (OUTPATIENT)
Dept: LAB | Facility: HOSPITAL | Age: 63
End: 2018-05-09
Payer: MEDICARE

## 2018-05-09 ENCOUNTER — OFFICE VISIT (OUTPATIENT)
Dept: TRANSPLANT | Facility: CLINIC | Age: 63
End: 2018-05-09
Payer: MEDICARE

## 2018-05-09 DIAGNOSIS — Z76.82 ORGAN TRANSPLANT CANDIDATE: ICD-10-CM

## 2018-05-09 DIAGNOSIS — D05.12 DUCTAL CARCINOMA IN SITU (DCIS) OF LEFT BREAST: ICD-10-CM

## 2018-05-09 DIAGNOSIS — N18.5 ANEMIA OF CHRONIC RENAL FAILURE, STAGE 5: Chronic | ICD-10-CM

## 2018-05-09 DIAGNOSIS — N25.81 SECONDARY HYPERPARATHYROIDISM OF RENAL ORIGIN: Chronic | ICD-10-CM

## 2018-05-09 DIAGNOSIS — N18.6 ESRD (END STAGE RENAL DISEASE): Chronic | ICD-10-CM

## 2018-05-09 DIAGNOSIS — Z76.82 AWAITING TRANSPLANTATION OF KIDNEY: Primary | ICD-10-CM

## 2018-05-09 DIAGNOSIS — D63.1 ANEMIA OF CHRONIC RENAL FAILURE, STAGE 5: Chronic | ICD-10-CM

## 2018-05-09 LAB — PTH-INTACT SERPL-MCNC: 406 PG/ML

## 2018-05-09 PROCEDURE — 83970 ASSAY OF PARATHORMONE: CPT | Mod: TXP

## 2018-05-09 PROCEDURE — 86706 HEP B SURFACE ANTIBODY: CPT | Mod: TXP

## 2018-05-09 PROCEDURE — 99001 SPECIMEN HANDLING PT-LAB: CPT | Mod: PO

## 2018-05-09 PROCEDURE — 99215 OFFICE O/P EST HI 40 MIN: CPT | Mod: S$PBB,TXP,, | Performed by: INTERNAL MEDICINE

## 2018-05-09 PROCEDURE — 99212 OFFICE O/P EST SF 10 MIN: CPT | Mod: PBBFAC,TXP | Performed by: INTERNAL MEDICINE

## 2018-05-09 PROCEDURE — 99999 PR PBB SHADOW E&M-EST. PATIENT-LVL II: CPT | Mod: PBBFAC,TXP,, | Performed by: INTERNAL MEDICINE

## 2018-05-09 NOTE — PROGRESS NOTES
"Transplant Recipient Adult Psychosocial Assessment Update      Archana Ward  [address] [place] [address]    Telephone Information:   Mobile [phone]   Home  [phone] (home)  Work  There is no work phone number on file.  E-mail  No e-mail address on record    Sex: female  YOB: 1955  Age: 63 y.o.    Encounter Date: 2018  U[place] Citizen: yes  Primary Language: English   Needed: no    Emergency Contact:  Name: David Romero  Relationship: sister  Address: [address] [place] [address]  Phone Numbers:  [phone] (home), [phone] (mobile)    Family/Social Support:   Number of dependents/: Pt reports no dependents.  Marital history: Pt reports being separted from her , Pedrito Ward for 38 years. Pt states, "I don't know where he is, they said he's in a home".  Other family dynamics: Pt reports both of pt's parents are . Pt reports 5 adult children: Salma, Bhavya, Suraj, Mario, Bossman, and Andron; 1 daughter-in-law: Eliza (with Bossman); 4 sisters (3 living): Tanika, Ashley, and David, and 4 brothers: Dennys Taylor, Faisal, George, and Waqar.      Household Composition:  Name: Jumana Romero  Age: 73  Relationship: sister  Does person drive? yes      Do you and your caregivers have access to reliable transportation? yes Pt reports that she utilizes Medicaid transportation to her medical appointments.   SW educated Patient and Caregiver on having own transportation due to Medicaid transportation being an unreliable means of transportation immediately after transplant and for unplanned emergencies. Patient and Caregiver verbalized understanding and agreement.     PRIMARY CAREGIVER:   Pt was accompanied by her dtr, Inocencio May [phone].  Inocencio stated she will be pt's primary caregiver "as long as someone can fill out my FMLA paperwork for me." Inocencio stated she works for the [employer] and gets NO PAID TIME OFF, but " "is entitled to FMLA.       Additional Significant Others who will Assist with Transplant:  Name: David Romero   Age: 73  Phone: [phone]; [phone](cell)  City: [place] State: [place]  Relationship: sister  Does person drive? yes     Name: Bossman Ward   Age: not provided  Phone: [phone]  City: [place] State: [place]  Relationship: son  Does person drive? yes    Name: Simon Ward   Age: not provided  Phone: [phone]  City: [place] State: [place]  Relationship: son  Does person drive? yes    Living Will: no Education and infomation provided to pt.  Healthcare Power of : yes. SW placed a copy to be scanned into chart.  Advance Directives on file: <Received per medical record.  Verbally reviewed LW/HCPA information.   provided patient with copy of LW/HCPA documents and provided education on completion of forms.    Living Donors: No. Education and resource information given to patient.    Highest Education Level: High School (9-12) or GED (11th grade)  Reading Ability: 9th grade  Reports difficulty with: seeing, hearing, comprehension and memory. Pt wears bifocal glasses, reports hearing in right ear is muffled, and reports that things have to be repeated multiple times to help her comprehend.  Pt reports memory difficulty effects taking medications in a timely manner. Pt discussed taking Renvela but also "sometimes forgets to take it and then takes it when I remember." Discussed results of neuropsych testing and the importance of developing a plan of support to enable better medication adherence.  Pt verbalized understanding and agreement.  Learns Best By:  Pt reports pt learns best by a combination of verbal, written, and tactile instructions.     Status: no  VA Benefits: no     Working for Income: No  If no, reason not working: Disability  Patient is disabled.  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    Spouse/Significant " "Other Employment: Pt reports that she is  from her , does not know where he is, and reports that "they said he's in a home".    Disabled: yes: date disability began: , due to: ESRD.    Monthly Income:  SS Disability: $733  Able to afford all costs now and if transplanted, including medications: yes Pt reports that he siblings and children currently and will continue to assist.     DARRELL discussed pt conducting fundraising initiatives to assist pt with pay for cost of medications, food, gas, and other transplant related needs. Patient and Caregiver verbalized understanding. DARRELL also provided pt with her Listing Letter so pt can set up a fundraising account.     Patient and Caregiver verbalizes understanding of personal responsibilities related to transplant costs and the importance of having a financial plan to ensure that patients transplant costs are fully covered.      provided fundraising information/education.  Patient and Caregiver verbalizes understanding.   remains available.    Insurance:   Payor/Plan Subscr  Sex Relation Sub. Ins. ID Effective Group Num   1. [place]ARE - [place]* LUIS GUAMAN M* 1955 Female  [id number]                                    [address]   2. [place] M* 1955 Female  [id number]* 14                                    [address] [address]       Primary Insurance (for UNOS reporting): Public Insurance - Medicare FFS (Fee For Service)  Secondary Insurance (for UNOS reporting): Public Insurance - Medicaid  Patient and Caregiver verbalizes clear understanding that patient may experience difficulty obtaining and/or be denied insurance coverage post-surgery. This includes and is not limited to disability insurance, life insurance, health insurance, burial insurance, long term care insurance, and other insurances.    Patient and Caregiver also reports understanding that future health concerns related to or " unrelated to transplantation may not be covered by patient's insurance.  Resources and information provided and reviewed.      Patient and Caregiver provides verbal permission to release any necessary information to outside resources for patient care and discharge planning.  Resources and information provided are reviewed.      Dialysis Adherence:  Patient reports being on hemodialysis in center attending all dialysis appointments and staying for the entire course of treatment. Compliance check:  Dialysis unit reports in the past three months pt has had 0 no shows, 0 AMAs, labs  (4/19/18); no concerns with labs, transportation none reported and family support none reported.  Reported by fax back sheet    Infusion Service: patient utilizing? no  Home Health: patient utilizing? no  DME: yes Pt reports having a rolling walker that she sometimes uses .  Pulmonary/Cardiac Rehab: Pt denies.   ADLS:  Pt does not drive. Pt reports difficulties with walking, reading, memory, and taking medications. Pt reports no difficulties with  bathing, cooking, housekeeping, eating, and shopping.    Adherence:   Pt reports good adherence with medications, dialysis, and health regimen.  Adherence education and counseling provided.     Per History Section:  Past Medical History:   Diagnosis Date    Anemia of chronic renal failure 8/6/2014    Breast cancer     ESRD 2/2 HTN on HD since 5/22/13 8/6/2014    Hypertension - Dx in 30s 8/6/2014    Kidney transplant candidate 8/6/2014    Secondary hyperparathyroidism of renal origin 8/6/2014     Social History - per psychosocial   Substance Use Topics    Smoking status: Former Smoker     Packs/day: 0.50     Years: 10.00     Quit date: 8/6/2012    Smokeless tobacco: Not on file    Alcohol use No     History   Drug Use No     History   Sexual Activity    Sexual activity: No     Patient states clear understanding of the potential impact of substance use as it relates to transplant  candidacy and is aware of possible random substance screening.  Substance abstinence/cessation counseling, education and resources provided and reviewed.     Arrests/DWI/Treatment/Rehab: patient denies    Psychiatric History:    Mental Health: Pt reports no history of or current mental health issues or concerns.   Psychiatrist/Counselor: Pt denies seeing a mental health professional and reports being open to seeing the psych department for talk therapy if necessary.  Pt had no recollection of neuropsych testing, but stated she is aware of her memory problems.  Pt's dtr seems to be unaware of the extent of pt's memory deficit.     Medications:  Pt denies taking medications for mental health reasons.  Suicide/Homicide Issues: Pt denies any history of or current suicidal or homicidal ideations.    Safety at home: Pt reports no safety concerns in household.    Knowledge: Patient and Caregiver states having clear understanding and realistic expectations regarding the potential risks and potential benefits of organ transplantation and organ donation, agrees to discuss with health care team members and support system members and to utilize available resources and express questions and/or concerns in order to further facilitate the pt informed decision-making.  Resources and information provided and reviewed.     Patient and Caregiver is aware of [facility] affiliation and/or partnership with agencies in home health care, LTAC, SNF, [facility], and other hospitals and clinics.    Understanding: Patient and Caregiver reports having a clear understanding of the many lifetime commitments involved with being a transplant recipient, including costs, compliance, medications, lab work, procedures, appointments, concrete and financial planning, preparedness, timely and appropriate communication of concerns, abstinence (ETOH, tobacco, illicit non-prescribed drugs), adherence to all health care team recommendations, support system and  caregiver involvement, appropriate and timely resource utilization and follow-through, mental health counseling as needed/recommended, and patient and caregiver responsibilities.  Social Service Handbook, resources and detailed educational information provided and reviewed.  Educational information provided.    Patient and Caregiver also reports current and expected compliance with health care regime and states having a clear understanding of the importance of compliance.      Patient and Caregiver reports a clear understanding that risks and benefits may be involved with organ transplantation and with organ donation.      Patient and Caregiver also reports clear understanding that psychosocial risk factors may affect patient, and include but are not limited to feelings of depression, generalized anxiety, anxiety regarding dependence on others, post traumatic stress disorder, feelings of guilt and other emotional and/or mental concerns, and/or exacerbation of existing mental health concerns.  Detailed resources provided and discussed.     Patient and Caregiver agrees to access appropriate resources in a timely manner as needed and/or as recommended, and to communicate concerns appropriately.  Patient also reports a clear understanding of treatment options available.      reviewed education, provided additional information, and answered questions.    Feelings or Concerns: Patient and Caregiver did not express any concerns at this time.    Coping: Pt reports coping well with the transplant process at this time and reports playing on the phone, watching tv, and talking with family ways to cope. Pt reports [facility] in [place], [place].     Goals: Pt reports getting off diaysis as goals for after transplant.  Patient referred to Vocational Rehabilitation. SW provided support and education. Pt verbalizes understanding that transplant is not a guarantee of ending dialysis and life  "after transplantation will be a "new normal" post transplant.  SW remains available.      Interview Behavior: Patient presents as alert and oriented x 4, good eye contact, calm, cooperative and was a poor historian. Pt presented with dtrInocencio, who initially presented as defensive and abrupt, however, appeared somewhat more receptive to sw and forthcoming with info.         Transplant Social Work - Candidacy  Assessment/Plan:     Psychosocial Suitability: Patient presents as an unacceptable candidate for kidney transplant at this time. Based on psychosocial risk factors, patient presents as high risk due to fragile caregiver plan (Pt has been listed for four years and has not presented with caregiver in person until today.  Dtr states she is entitled to FMLA but will not get paid if she does not work.), high dependence upon Medicaid transportation and memory difficulties that effect pt's adherence to medications.  SW recommends continuation of hold until an appropriate caregiver plan can be confirmed.      Recommendations/Additional Comments:  counseled patient extensively on fundraising, transportation, caregiver plan and need to identify alternate caregivers.   recommends fundraising, caregiver plan and have identified alternate caregivers (pt states sister David and son Bossman) as well as primary caregiver, dtr, Inocencio,schedule date for teaching (INITIAL TEACHING) and sw eval.  Pt's reports identified caregiver(s) has never attended caregiver education on appts with pt until today (per pt and pt's EMR).  Patient verbalizes understanding.   remains available.      Blanca Dupree LCSW         "

## 2018-05-09 NOTE — LETTER
May 10, 2018        José Luis Wheat  [address] [place] [address] [address]  [place] [address]  Phone: [phone]  Fax: [phone]             [facility]  [address]  [facility] [address]  Phone: [phone]   Patient: Archana Ward   MR Number: [id number]   YOB: 1955   Date of Visit: 5/9/2018       Dear Dr. José Luis Wheat    Thank you for referring Archana Ward to me for evaluation. Attached you will find relevant portions of my assessment and plan of care.    If you have questions, please do not hesitate to call me. I look forward to following Archana Ward along with you.    Sincerely,    Brandie Burris MD    Enclosure    If you would like to receive this communication electronically, please contact [email or web address] or [phone] to request [employer] access.    [employer] is a tool which provides read-only access to select patient information with whom you have a relationship. Its easy to use and provides real time access to review your patients record including encounter summaries, notes, results, and demographic information.    If you feel you have received this communication in error or would no longer like to receive these types of communications, please e-mail [email or web address]

## 2018-05-09 NOTE — PROGRESS NOTES
LISTED PATIENT EDUCATION NOTE    Ms. Archana Ward was seen in pre-kidney transplant clinic for evaluation for kidney, kidney/pancreas or pancreas only transplant.  The patient attended a group education session that discussed/reviewed the following aspects of transplantation: evaluation and selection committee process, UNOS waitlist management/multiple listings, types of organs offered (KDPI < 85%, KDPI > 85%, PHS increased risk, DCD), financial aspects, surgical procedures, dietary instruction pre- and post-transplant, health maintenance pre- and post-transplant, post-transplant hospitalization and outpatient follow-up, potential to participate in a research protocol, and medication management and side effects.  A question and answer session was provided after the presentation.    The patient was seen by all members of the multi-disciplinary team to include: Nephrologist/PA, Surgeon, , Transplant Coordinator, , Pharmacist and Dietician (if applicable).    The patient reviewed and signed all consents for evaluation which were witnessed and sent to scanning into the EPIC chart.    The patient was given an education book and plan for further evaluation based on her individual assessment.      The patient was encouraged to call with any questions or concerns.

## 2018-05-09 NOTE — PROGRESS NOTES
KIDNEY, KIDNEY/PANCREAS, PANCREAS TRANSPLANT RECIPIENT REEVALUATION      SUBJECTIVE     CC:   Six monthly/Annual reassessment of kidney transplant candidacy.    HPI:  Ms. Ward is a 63 y.o. Black or  female with end-stage renal disease/advanced kidney disease secondary to HTN.  She has been on the wait list for a kidney transplant at [facility] since 5/22/2013. She is currently in active due to lack of compliance and  lack of caregiver support. Patient went through the neuro psych evaluation for memory impairement . She was found that she is not able to be candidate for kidney transplant without direct family/caregiver supervision. Pt has been listed for four years and has not presented with caregiver in person until today).  Dtr states she is entitled to FMLA but will not get paid if she does not work.  Patient states that she has to cancel her appointment due to lack of transportation. The daughter states that she can not take her to her appointment as she works.     Patient is currently on hemodialysis started on 5/2013. Patient is dialyzing on TTS schedule.  Patient reports that she is tolerating dialysis well.. She has a LUE AV fistula. Patient with h/o DCIS seen by Oncology and deemed suitable candidate for transplantation. Patient denies any recent ER visit, hospitalization or recent history of coronary artery disease, stroke, seizure disorder, chronic obstructive pulmonary disease. Patient states that he is doing well. she denies any CP, SOB, nausea, vomiting, diarrhea, abdominal pain, cough, fever, chills, weight loss.       Functional Status:  She walks 2-3 blocks without any symptoms of chest pain, SOB, claudication.   Previous Transplant: no  Previous Blood Transfusion: yes   Anticoagulation/ antiplatelet therapy and reason: no  Potential Donor: no        Past Medical History:  Past Medical History:   Diagnosis Date    Anemia of chronic renal failure 8/6/2014    Breast cancer     ESRD 2/2  HTN on HD since 5/22/13 8/6/2014    Hypertension - Dx in 30s 8/6/2014    Kidney transplant candidate 8/6/2014    Secondary hyperparathyroidism of renal origin 8/6/2014       Past Surgical History:  Past Surgical History:   Procedure Laterality Date    AV FISTULA PLACEMENT  2013    BREAST BIOPSY Bilateral     years ago    CHOLECYSTECTOMY  1980s    HYSTERECTOMY  1970's         Family History:  Family History   Problem Relation Age of Onset    Heart disease Father     Breast cancer Neg Hx     Cancer Neg Hx     Colon cancer Neg Hx     Ovarian cancer Neg Hx        Social History:  Social History     Social History    Marital status: Legally      Spouse name: N/A    Number of children: N/A    Years of education: N/A     Occupational History     Disabled     Social History Main Topics    Smoking status: Former Smoker     Packs/day: 0.50     Years: 10.00     Quit date: 8/6/2012    Smokeless tobacco: Never Used    Alcohol use No    Drug use: No    Sexual activity: No     Other Topics Concern    Not on file     Social History Narrative    No narrative on file           Current Medication  Current Outpatient Prescriptions   Medication Sig Dispense Refill    aspirin 81 MG Chew Take 81 mg by mouth once daily.      calcium acetate (PHOSLO) 667 mg capsule TAKE 4 CAPSULES BY MOUTH THREE TIMES A DAY WITH MEALS AND TAKE 4 CAPSULES BY MOUTH TWO TIMES A DAY WITH SNACKS  6    SENSIPAR 60 mg Tab TAKE ONE TABLET BY MOUTH DAILY WITH EVENING MEAL OR AT BEDTIME   CHANGE IN DOSE  6    gabapentin (NEURONTIN) 100 MG capsule Take 100 mg by mouth 3 (three) times daily.      sevelamer carbonate (RENVELA) 800 mg Tab Take 800 mg by mouth 3 (three) times daily with meals.       No current facility-administered medications for this visit.              Review of Systems  Skin: no skin rash  CNS; no headaches, blurred vision, seizure, or syncope  ENT: No JVD,  Adenopathies,  nasal congestion. No oral  lesions  Cardiac: No chest pain, dyspnea, claudication, edema or palpitations  Respiratory: No SOB, cough, hemoptysis   Gastro-intestinal: No diarrhea, constipation, abdominal pain, nausea, vomit. No ascitis  Genitourinary: no hematuria, dysuria, frequency, frequency  Musculoskeletal: joint pain, arthritis or vasculitic changes  Psych: alert awake, oriented, No cranial nerves deficit.     OBJECTIVE         Physical Exam     Head: normocephalic  Neck: No JVD, cervical axillary, or femoral adenopathies  Heart: no murmurs, Normal s1 and s2, No gallops, no rubs, No murmurs  Lungs; CTA, good respiratory effort, no crackles  Abdomen: soft, non tender, no splenomegaly or hepatomegaly, no massess, no bruits  Extremities: No edema, skin rash, joint pain. LUE AVF  SNC: awake, alert oriented. Cranial nerves are intact, no focalized, sensitivity and strength preserved      Labs:  Reviewed with the patient      ASSESSMENT     1. Awaiting transplantation of kidney    2. Secondary hyperparathyroidism of renal origin    3. Anemia of chronic renal failure, stage 5    4. Ductal carcinoma in situ (DCIS) of left breast    5. ESRD 2/2 HTN on HD since 5/22/13        PLAN       Transplant Candidacy:    Ms. Ward is a an unacceptable for kidney transplantation unless  she could have dedicated caregiver who can supervise her 24/7. And her PTH would be less than 1000.

## 2018-05-10 LAB
HEP. B SURF AB, QUAL: POSITIVE
HEP. B SURF AB, QUANT.: 16 MIU/ML

## 2018-05-21 LAB
HLA FREEZE AND HOLD INTERPRETATION: NORMAL
HLAFH COLLECTION DATE: NORMAL
HPRA INTERPRETATION: NORMAL

## 2018-06-14 ENCOUNTER — LAB VISIT (OUTPATIENT)
Dept: LAB | Facility: HOSPITAL | Age: 63
End: 2018-06-14
Attending: SURGERY
Payer: MEDICARE

## 2018-06-14 ENCOUNTER — OFFICE VISIT (OUTPATIENT)
Dept: SURGERY | Facility: CLINIC | Age: 63
End: 2018-06-14
Payer: MEDICARE

## 2018-06-14 VITALS
HEART RATE: 76 BPM | TEMPERATURE: 98 F | WEIGHT: 148.13 LBS | SYSTOLIC BLOOD PRESSURE: 141 MMHG | DIASTOLIC BLOOD PRESSURE: 83 MMHG | HEIGHT: 62 IN | BODY MASS INDEX: 27.26 KG/M2

## 2018-06-14 DIAGNOSIS — N25.81 SECONDARY HYPERPARATHYROIDISM OF RENAL ORIGIN: Chronic | ICD-10-CM

## 2018-06-14 DIAGNOSIS — E21.3 HYPERPARATHYROIDISM: Primary | ICD-10-CM

## 2018-06-14 DIAGNOSIS — E21.3 HYPERPARATHYROIDISM: ICD-10-CM

## 2018-06-14 LAB
25(OH)D3+25(OH)D2 SERPL-MCNC: 20 NG/ML
ALBUMIN SERPL BCP-MCNC: 3.9 G/DL
ALP SERPL-CCNC: 198 U/L
ALT SERPL W/O P-5'-P-CCNC: 12 U/L
ANION GAP SERPL CALC-SCNC: 12 MMOL/L
AST SERPL-CCNC: 30 U/L
BILIRUB SERPL-MCNC: 0.9 MG/DL
BUN SERPL-MCNC: 25 MG/DL
CA-I BLDV-SCNC: 1.07 MMOL/L
CALCIUM SERPL-MCNC: 9.9 MG/DL
CHLORIDE SERPL-SCNC: 99 MMOL/L
CO2 SERPL-SCNC: 33 MMOL/L
CREAT SERPL-MCNC: 6.5 MG/DL
EST. GFR  (AFRICAN AMERICAN): 7.1 ML/MIN/1.73 M^2
EST. GFR  (NON AFRICAN AMERICAN): 6.2 ML/MIN/1.73 M^2
GLUCOSE SERPL-MCNC: 102 MG/DL
POTASSIUM SERPL-SCNC: 3.9 MMOL/L
PROT SERPL-MCNC: 8.4 G/DL
PTH-INTACT SERPL-MCNC: 782 PG/ML
SODIUM SERPL-SCNC: 144 MMOL/L

## 2018-06-14 PROCEDURE — 82330 ASSAY OF CALCIUM: CPT

## 2018-06-14 PROCEDURE — 99213 OFFICE O/P EST LOW 20 MIN: CPT | Mod: PBBFAC | Performed by: SURGERY

## 2018-06-14 PROCEDURE — 36415 COLL VENOUS BLD VENIPUNCTURE: CPT

## 2018-06-14 PROCEDURE — 82306 VITAMIN D 25 HYDROXY: CPT

## 2018-06-14 PROCEDURE — 99999 PR PBB SHADOW E&M-EST. PATIENT-LVL III: CPT | Mod: PBBFAC,,, | Performed by: SURGERY

## 2018-06-14 PROCEDURE — 83970 ASSAY OF PARATHORMONE: CPT

## 2018-06-14 PROCEDURE — 80053 COMPREHEN METABOLIC PANEL: CPT

## 2018-06-14 PROCEDURE — 99214 OFFICE O/P EST MOD 30 MIN: CPT | Mod: S$PBB,,, | Performed by: SURGERY

## 2018-06-14 NOTE — Clinical Note
Lisa , f/u with me at end of July to schedule subtotal parathyroidectomy.  She still needs her 24 hour urine for Ca and creatinine. Thanks, RLC

## 2018-06-23 NOTE — PROGRESS NOTES
CC:   Six monthly/Annual reassessment of kidney transplant candidacy.  Presents to discuss subtotal parathyroidectomy  Due to      HPI:  Ms. Ward is a 63 y.o. Black or  female with end-stage renal disease/advanced kidney disease secondary to HTN.  She has been on the wait list for a kidney transplant at [facility] since 5/22/2013. She is currently in active due to lack of compliance and  lack of caregiver support. Patient went through the neuro psych evaluation for memory impairement . She was found that she is not able to be candidate for kidney transplant without direct family/caregiver supervision. Pt has been listed for four years and has not presented with caregiver in person until today).  Dtr states she is entitled to FMLA but will not get paid if she does not work.  Patient states that she has to cancel her appointment due to lack of transportation. The daughter states that she can not take her to her appointment as she works.      Patient is currently on hemodialysis started on 5/2013. Patient is dialyzing on TTS schedule.  Patient reports that she is tolerating dialysis well.. She has a LUE AV fistula. Patient with h/o DCIS seen by Oncology and deemed suitable candidate for transplantation. Patient denies any recent ER visit, hospitalization or recent history of coronary artery disease, stroke, seizure disorder, chronic obstructive pulmonary disease. Patient states that he is doing well. she denies any CP, SOB, nausea, vomiting, diarrhea, abdominal pain, cough, fever, chills, weight loss.         Functional Status:  She walks 2-3 blocks without any symptoms of chest pain, SOB, claudication.   Previous Transplant: no  Previous Blood Transfusion: yes   Anticoagulation/ antiplatelet therapy and reason: no  Potential Donor: no           Past Medical History:       Past Medical History:   Diagnosis Date    Anemia of chronic renal failure 8/6/2014    Breast cancer      ESRD 2/2 HTN on HD since  5/22/13 8/6/2014    Hypertension - Dx in 30s 8/6/2014    Kidney transplant candidate 8/6/2014    Secondary hyperparathyroidism of renal origin 8/6/2014         Past Surgical History:        Past Surgical History:   Procedure Laterality Date    AV FISTULA PLACEMENT   2013    BREAST BIOPSY Bilateral       years ago    CHOLECYSTECTOMY   1980s    HYSTERECTOMY   1970's            Family History:        Family History   Problem Relation Age of Onset    Heart disease Father      Breast cancer Neg Hx      Cancer Neg Hx      Colon cancer Neg Hx      Ovarian cancer Neg Hx           Social History:  Social History   Social History            Social History    Marital status: Legally        Spouse name: N/A    Number of children: N/A    Years of education: N/A           Occupational History      Disabled            Social History Main Topics    Smoking status: Former Smoker       Packs/day: 0.50       Years: 10.00       Quit date: 8/6/2012    Smokeless tobacco: Never Used    Alcohol use No    Drug use: No    Sexual activity: No           Other Topics Concern    Not on file          Social History Narrative    No narrative on file                  Current Medication  Current Medications          Current Outpatient Prescriptions   Medication Sig Dispense Refill    aspirin 81 MG Chew Take 81 mg by mouth once daily.        calcium acetate (PHOSLO) 667 mg capsule TAKE 4 CAPSULES BY MOUTH THREE TIMES A DAY WITH MEALS AND TAKE 4 CAPSULES BY MOUTH TWO TIMES A DAY WITH SNACKS   6    SENSIPAR 60 mg Tab TAKE ONE TABLET BY MOUTH DAILY WITH EVENING MEAL OR AT BEDTIME   CHANGE IN DOSE   6    gabapentin (NEURONTIN) 100 MG capsule Take 100 mg by mouth 3 (three) times daily.        sevelamer carbonate (RENVELA) 800 mg Tab Take 800 mg by mouth 3 (three) times daily with meals.          No current facility-administered medications for this visit.                      Review of Systems  Skin: no skin rash  CNS;  no headaches, blurred vision, seizure, or syncope  ENT: No JVD,  Adenopathies,  nasal congestion. No oral lesions  Cardiac: No chest pain, dyspnea, claudication, edema or palpitations  Respiratory: No SOB, cough, hemoptysis   Gastro-intestinal: No diarrhea, constipation, abdominal pain, nausea, vomit. No ascitis  Genitourinary: no hematuria, dysuria, frequency, frequency  Musculoskeletal: joint pain, arthritis or vasculitic changes  Psych: alert awake, oriented, No cranial nerves deficit.     OBJECTIVE            Physical Exam      Head: normocephalic  Neck: No JVD, cervical axillary, or femoral adenopathies  Heart: no murmurs, Normal s1 and s2, No gallops, no rubs, No murmurs  Lungs; CTA, good respiratory effort, no crackles  Abdomen: soft, non tender, no splenomegaly or hepatomegaly, no massess, no bruits  Extremities: No edema, skin rash, joint pain. LUE AVF  SNC: awake, alert oriented. Cranial nerves are intact, no focalized, sensitivity and strength preserved        Labs:  Reviewed with the patient, calcium levels normal with increased iPTH c/w secondary hyperparathyroidism.        ASSESSMENT      1. Awaiting transplantation of kidney    2. Secondary hyperparathyroidism of renal origin    3. Anemia of chronic renal failure, stage 5    4. Ductal carcinoma in situ (DCIS) of left breast    5. ESRD 2/2 HTN on HD since 5/22/13          DENZEL Ward is a very pleasant 63-year-old woman with end-stage renal   disease and secondary hyperparathyroidism who presents referred by Transplant   and Nephrology for consideration of parathyroidectomy for her secondary   hyperparathyroidism due to chronic renal failure.  Her nephrologist is Dr. Arroyo on the [facility].  She also sees Dr. Alston.  The patient has seen   Transplant Medicine here for evaluation for kidney transplant.  I do not have   the records from Dr. Arroyo on the [facility].  She was not felt to be an   appropriate candidate for transplantation  of this time for social reasons and   support mechanism as well as previously noted parathyroid hormone levels of   greater than 1000.  Her most recent intact parathyroid hormone level is 406 and   her calcium levels are normal.  She is on Sensipar.  She would like to be a   candidate for renal transplantation at some point and understands that surgical   improvement of her secondary hyperparathyroidism to bring her intact parathyroid   hormone levels down would benefit her long-term success for renal   transplantation.  She also is on Sensipar, the calcimimetic and states that that   drug is expensive as well.  At this point, we do not have recent labs in our   system.  I will order a repeat CMP, ionized calcium, vitamin D level and intact   parathyroid hormone level.  I will also order a sestamibi scan and neck,   ultrasound of the thyroid as well as bone mineral density scan and 24-hour urine   for calcium and creatinine to complete the workup since I do not have the   records here.  I will follow up with her following the scans and labs as noted   above and then likely schedule her for subtotal parathyroidectomy.  We discussed   the rationale for a subtotal parathyroidectomy leaving a small remnant of one   of the inferior glands in situ to maintain viability so that she does not   develop postoperative hypocalcemia and hypoparathyroidism.  We discussed leaving   a remnant that is approximately twice the size of one normal gland and she   understands the rationale and potential benefit in this leading to potential   renal transplantation and better control of her intact parathyroid hormone   levels to impact long-term patency of a graft in a beneficial manner.  Time   spent with the patient today was 30 minutes with greater than 50% of that time   in counseling.  We will follow up with her following the above labs and likely   schedule her for a subtotal parathyroidectomy at that point.      ERIKA/IN  dd:  06/23/2018 08:08:13 (CDT)  td: 06/23/2018 20:47:27 (CDT)  Doc ID   #[id number]  Job ID #[id number]    CC:     Job # [id number].

## 2018-06-27 ENCOUNTER — TELEPHONE (OUTPATIENT)
Dept: SURGERY | Facility: CLINIC | Age: 63
End: 2018-06-27

## 2018-06-27 NOTE — TELEPHONE ENCOUNTER
Attempted to call pt to remind her of 24 urine test that needs to be completed, no answer send  Appointment slip with reminder about test in the mail

## 2018-07-11 ENCOUNTER — HOSPITAL ENCOUNTER (OUTPATIENT)
Dept: RADIOLOGY | Facility: HOSPITAL | Age: 63
Discharge: HOME | End: 2018-07-11
Attending: SURGERY
Payer: MEDICARE

## 2018-07-11 ENCOUNTER — HOSPITAL ENCOUNTER (OUTPATIENT)
Dept: RADIOLOGY | Facility: CLINIC | Age: 63
Discharge: HOME | End: 2018-07-11
Attending: SURGERY
Payer: MEDICARE

## 2018-07-11 DIAGNOSIS — E21.3 HYPERPARATHYROIDISM: ICD-10-CM

## 2018-07-11 PROCEDURE — 78071 PARATHYRD PLANAR W/WO SUBTRJ: CPT | Mod: 26,,, | Performed by: RADIOLOGY

## 2018-07-11 PROCEDURE — 77080 DXA BONE DENSITY AXIAL: CPT | Mod: TC

## 2018-07-11 PROCEDURE — 76536 US EXAM OF HEAD AND NECK: CPT | Mod: 26,,, | Performed by: RADIOLOGY

## 2018-07-11 PROCEDURE — 76536 US EXAM OF HEAD AND NECK: CPT | Mod: TC

## 2018-07-11 PROCEDURE — 77080 DXA BONE DENSITY AXIAL: CPT | Mod: 26,,, | Performed by: INTERNAL MEDICINE

## 2018-07-11 PROCEDURE — 78071 PARATHYRD PLANAR W/WO SUBTRJ: CPT | Mod: TC

## 2018-07-16 ENCOUNTER — HOSPITAL ENCOUNTER (OUTPATIENT)
Dept: RADIOLOGY | Facility: HOSPITAL | Age: 63
Discharge: HOME | End: 2018-07-16
Attending: SURGERY
Payer: MEDICARE

## 2018-07-16 ENCOUNTER — OFFICE VISIT (OUTPATIENT)
Dept: SURGERY | Facility: CLINIC | Age: 63
End: 2018-07-16
Payer: MEDICARE

## 2018-07-16 VITALS
HEART RATE: 64 BPM | SYSTOLIC BLOOD PRESSURE: 138 MMHG | HEIGHT: 62 IN | DIASTOLIC BLOOD PRESSURE: 71 MMHG | TEMPERATURE: 98 F

## 2018-07-16 DIAGNOSIS — Z12.31 ENCOUNTER FOR SCREENING MAMMOGRAM FOR BREAST CANCER: ICD-10-CM

## 2018-07-16 DIAGNOSIS — Z85.3 PERSONAL HISTORY OF BREAST CANCER: Primary | ICD-10-CM

## 2018-07-16 DIAGNOSIS — Z85.3 PERSONAL HISTORY OF BREAST CANCER: ICD-10-CM

## 2018-07-16 PROCEDURE — 99999 PR PBB SHADOW E&M-EST. PATIENT-LVL III: CPT | Mod: PBBFAC,,, | Performed by: SURGERY

## 2018-07-16 PROCEDURE — 99213 OFFICE O/P EST LOW 20 MIN: CPT | Mod: S$PBB,,, | Performed by: SURGERY

## 2018-07-16 PROCEDURE — 77066 DX MAMMO INCL CAD BI: CPT | Mod: 26,,, | Performed by: RADIOLOGY

## 2018-07-16 PROCEDURE — 99213 OFFICE O/P EST LOW 20 MIN: CPT | Mod: PBBFAC,PO | Performed by: SURGERY

## 2018-07-16 PROCEDURE — 77062 BREAST TOMOSYNTHESIS BI: CPT | Mod: 26,,, | Performed by: RADIOLOGY

## 2018-07-16 PROCEDURE — 77062 BREAST TOMOSYNTHESIS BI: CPT | Mod: TC,PO

## 2018-07-16 RX ORDER — CINACALCET HYDROCHLORIDE 90 MG/1
90 TABLET, COATED ORAL DAILY
Refills: 6 | COMMUNITY
Start: 2018-05-24 | End: 2018-08-20 | Stop reason: CLARIF

## 2018-07-16 NOTE — PROGRESS NOTES
"Date of Service:7/16/2018      DIAGNOSIS:   This is a 63 y.o. female with a history of stage 0 Tis (3 cm) NxMx, grade 3, ER -, [place] - DCIS of the left breast.    TREATMENT:   1. Left partial mastectomy without sentinel node biopsy on 11/4/2015. Rachel Saldana M.D. Surgical Oncology  2. reexcision left breast 12/16/2015. Rachel Saldana, Surgical Oncology.  3. Radiation therapy from 2/2016  To 3/2016. Lucy Arevalo M.D. Radiation Oncology       HISTORY OF PRESENT ILLNESS:   Archana Ward is a 63 y.o. female comes in for oncological follow up. She denies any changes to her breasts over the pasts year aside from some dry skin over her left breast. She denies any nipple retraction, skin dimpling or new masses. She denies any fevers, chills, night sweats or weight loss. There is no new family history. Mammogram clear today.    IMAGING:   Due July 2019  MMG today negative    MEDICATIONS/ALLERGIES:     No Known Allergies    PHYSICAL EXAM:     /71 (BP Location: Right arm, Patient Position: Sitting, BP Method: Medium (Automatic))   Pulse 64   Temp 97.7 °F (36.5 °C) (Oral)   Ht 5' 2" (1.575 m)   LMP  (LMP Unknown)   General: The patient appears well and is in no acute distress.   Neuro: Alert & oriented x3. HEENT: PERRLA, EOMI, sclerae nonicteric. Mucous membranes moist.   Cardiovascular: RRR, no g/r/m.  Breasts: The exam was done with the patient seated and supine. Left breast - well healed scar. within normal limits. No palpable masses and no abnormal skin or nipple findings. No supraclavicular or axillary lymphadenopathy on the left side. Some hyperpigmentation from radiation, good cosmesis. Some volume loss from radiation  Right breast - within normal limits. No palpable masses or nipple findings. Left breast smaller. Area of dry skin over left breast surgical scar. No supraclavicular or axillary lymphadenopathy on the right side.   Extremities: No clubbing or cyanosis. Bilateral full arm range of motion without  " lymphedema.     ASSESSMENT:   This is a 63 y.o. female without evidence of recurrence by exam, history or imaging.       PLAN:   1. Continue to follow up with me, will give another bra prescription  2. Continue monthly self breast exams and call the clinic with any changes or problems.  3. Mammogram in July 2019 .  4. Return to clinic in 1 year. The patient is in agreement with the plan. Questions were encouraged and answered to patient's satisfaction. Archana will call our office with any questions or concerns.

## 2018-07-17 DIAGNOSIS — E04.1 THYROID NODULE: Primary | ICD-10-CM

## 2018-07-23 ENCOUNTER — HOSPITAL ENCOUNTER (OUTPATIENT)
Dept: RADIOLOGY | Facility: HOSPITAL | Age: 63
Discharge: HOME | End: 2018-07-23
Attending: SURGERY
Payer: MEDICARE

## 2018-07-23 DIAGNOSIS — E04.1 THYROID NODULE: ICD-10-CM

## 2018-07-23 PROCEDURE — 10022 US FINE NEEDLE ASPIRATION WITH IMAGING: CPT | Mod: ,,, | Performed by: RADIOLOGY

## 2018-07-23 PROCEDURE — 88173 CYTOPATH EVAL FNA REPORT: CPT | Mod: 26,,, | Performed by: PATHOLOGY

## 2018-07-23 PROCEDURE — 88173 CYTOPATH EVAL FNA REPORT: CPT | Performed by: PATHOLOGY

## 2018-07-23 PROCEDURE — 76942 ECHO GUIDE FOR BIOPSY: CPT | Mod: 26,,, | Performed by: RADIOLOGY

## 2018-07-23 PROCEDURE — 10022 US FINE NEEDLE ASPIRATION WITH IMAGING: CPT

## 2018-07-23 NOTE — DISCHARGE SUMMARY
Radiology Discharge Summary      Hospital Course: No complications    Admit Date: 7/23/2018  Discharge Date: 07/23/2018     Instructions Given to Patient: Yes  Diet: Resume prior diet  Activity: activity as tolerated    Description of Condition on Discharge: Stable  Vital Signs (Most Recent):      Discharge Disposition: Home    Discharge Diagnosis: left thyroid nodule     Procedure performed: thyroid FNA    Follow-up: No dedicated follow up with radiology is required. Patient instructed to call if there is any complication, post procedural pain, or signs of infection. Return care to primary ordering provider and to follow up results.      Gayla Leach MD  Department of Radiology  Resident

## 2018-07-23 NOTE — PROCEDURES
Radiology Post-Procedure Note    Pre Op Diagnosis: Thyroid nodule    Post Op Diagnosis: same    Procedure: Ultrasound guided thyroid biopsy    Procedure performed by: Brandon Lakhani MD    Written Informed Consent Obtained: Yes    Specimen Removed: YES, 4 FNA passes    Estimated Blood Loss: Minimal    Findings: Local anesthesia was used.    Fine needle aspiration x 4 was performed for evaluation of left sided thyroid complex nodule using ultrasound guidance. Tissue specimen sent to the laboratory for further analysis.    There were no complications and the patient tolerated the procedure well.  Please see Imaging report for further details.      Gayla Leach MD  Department of Radiology  Resident

## 2018-07-23 NOTE — H&P
Radiology History & Physical      SUBJECTIVE:     Chief Complaint: thyroid nodule    History of Present Illness:  Archana Ward is a 63 y.o. female with a complex nodule within the left thyroid lobe who presents today for an FNA.  The nodule was found incidentally on imaging for patient's parathyroid.     Past Medical History:   Diagnosis Date    Anemia of chronic renal failure 8/6/2014    Breast cancer     ESRD 2/2 HTN on HD since 5/22/13 8/6/2014    Hypertension - Dx in 30s 8/6/2014    Kidney transplant candidate 8/6/2014    Secondary hyperparathyroidism of renal origin 8/6/2014     Past Surgical History:   Procedure Laterality Date    AV FISTULA PLACEMENT  2013    BREAST BIOPSY Bilateral     years ago    CHOLECYSTECTOMY  1980s    HYSTERECTOMY  1970's       Home Meds:   Prior to Admission medications    Medication Sig Start Date End Date Taking? Authorizing Provider   aspirin 81 MG Chew Take 81 mg by mouth once daily.    Historical Provider, MD   calcium acetate (PHOSLO) 667 mg capsule TAKE 4 CAPSULES BY MOUTH THREE TIMES A DAY WITH MEALS AND TAKE 4 CAPSULES BY MOUTH TWO TIMES A DAY WITH SNACKS 7/7/17   Historical Provider, MD   gabapentin (NEURONTIN) 100 MG capsule Take 100 mg by mouth 3 (three) times daily.    Historical Provider, MD   SENSIPAR 60 mg Tab TAKE ONE TABLET BY MOUTH DAILY WITH EVENING MEAL OR AT BEDTIME   CHANGE IN DOSE 11/8/16   Historical Provider, MD   SENSIPAR 90 mg Tab Take 90 mg by mouth once daily. 5/24/18   Historical Provider, MD   sevelamer carbonate (RENVELA) 800 mg Tab Take 800 mg by mouth 3 (three) times daily with meals.    Historical Provider, MD     Anticoagulants/Antiplatelets: Aspirin (last taken >1 mo)    Allergies: Review of patient's allergies indicates:  No Known Allergies  Sedation History:  no adverse reactions    Review of Systems:   Hematological: no known coagulopathies  Respiratory: no shortness of breath  Cardiovascular: no chest  pain  Gastrointestinal: no abdominal pain  Genito-Urinary: no dysuria  Musculoskeletal: negative  Neurological: no TIA or stroke symptoms         OBJECTIVE:     Vital Signs (Most Recent)       Physical Exam:  General: no acute distress  Mental Status: alert and oriented to person, place and time  HEENT: normocephalic, atraumatic  Chest: unlabored breathing  Heart: regular heart rate  Abdomen: nondistended  Extremity: moves all extremities    Laboratory  Lab Results   Component Value Date    INR 1.0 08/06/2014       Lab Results   Component Value Date    WBC 4.89 08/06/2014    HGB 12.3 08/06/2014    HCT 40.6 08/06/2014    MCV 97 08/06/2014     08/06/2014      Lab Results   Component Value Date     06/14/2018     06/14/2018    K 3.9 06/14/2018    CL 99 06/14/2018    CO2 33 (H) 06/14/2018    BUN 25 (H) 06/14/2018    CREATININE 6.5 (H) 06/14/2018    CALCIUM 9.9 06/14/2018    ALT 12 06/14/2018    AST 30 06/14/2018    ALBUMIN 3.9 06/14/2018    BILITOT 0.9 06/14/2018    BILIDIR 0.2 08/06/2014       ASSESSMENT/PLAN:     Sedation Plan: local    Patient will undergo FNA of thyroid nodule.      Gayla Leach MD  Department of Radiology  Resident

## 2018-07-26 ENCOUNTER — OFFICE VISIT (OUTPATIENT)
Dept: SURGERY | Facility: CLINIC | Age: 63
End: 2018-07-26
Payer: MEDICARE

## 2018-07-26 VITALS
WEIGHT: 145.31 LBS | TEMPERATURE: 98 F | HEART RATE: 65 BPM | SYSTOLIC BLOOD PRESSURE: 139 MMHG | HEIGHT: 62 IN | DIASTOLIC BLOOD PRESSURE: 70 MMHG | BODY MASS INDEX: 26.74 KG/M2

## 2018-07-26 DIAGNOSIS — N25.81 SECONDARY HYPERPARATHYROIDISM OF RENAL ORIGIN: Primary | Chronic | ICD-10-CM

## 2018-07-26 PROCEDURE — 99213 OFFICE O/P EST LOW 20 MIN: CPT | Mod: PBBFAC | Performed by: SURGERY

## 2018-07-26 PROCEDURE — 99213 OFFICE O/P EST LOW 20 MIN: CPT | Mod: S$PBB,,, | Performed by: SURGERY

## 2018-07-26 PROCEDURE — 99999 PR PBB SHADOW E&M-EST. PATIENT-LVL III: CPT | Mod: PBBFAC,,, | Performed by: SURGERY

## 2018-07-26 NOTE — LETTER
[facility] - General Surgery  [address]  [facility] [address]  Phone: [phone] July 31, 2018      Ashley Rodriguez MD  [address] [place] [address] Bl  [address]  [place] [id number]    Patient: Archana Ward   MR Number: [id number]   YOB: 1955   Date of Visit: 7/26/2018     Dear Dr. Rodriguez:    Thank you for referring Archana Ward to me for evaluation. Below are the relevant portions of my assessment and plan of care.    Archana Ward is a very pleasant 63-year-old woman with end-stage renal disease and secondary hyperparathyroidism who presents referred by Transplant and Nephrology for consideration of parathyroidectomy for her secondary hyperparathyroidism due to chronic renal failure.  Her nephrologist is Dr. Arroyo on the [facility].  She also sees Dr. Alston.  The patient has seen Transplant Medicine here for evaluation for kidney transplant.  I do not have the records from Dr. Arroyo on the [facility].  She was not felt to be an appropriate candidate for transplantation of this time for social reasons and support mechanism as well as previously noted parathyroid hormone levels of greater than 1000.  Her most recent intact parathyroid hormone level is 406 and her calcium levels are normal.  She is on Sensipar.  She would like to be a candidate for renal transplantation at some point and understands that surgical improvement of her secondary hyperparathyroidism to bring her intact parathyroid hormone levels down would benefit her long-term success for renal transplantation.  She also is on Sensipar, the calcimimetic and states that that drug is expensive as well.      At this point, we do not have recent labs in our system.  I will order a repeat CMP, ionized calcium, vitamin D level and intact parathyroid hormone level.  I will also order a sestamibi scan and neck, ultrasound of the thyroid as well as bone mineral density scan and 24-hour urine for calcium and creatinine to  complete the workup since I do not have the records here.  I will follow up with her following the scans and labs as noted above and then likely schedule her for subtotal parathyroidectomy.      We discussed the rationale for a subtotal parathyroidectomy leaving a small remnant of one of the inferior glands in situ to maintain viability so that she does not develop postoperative hypocalcemia and hypoparathyroidism.  We discussed leaving a remnant that is approximately twice the size of one normal gland and she understands the rationale and potential benefit in this leading to potential renal transplantation and better control of her intact parathyroid hormone levels to impact long-term patency of a graft in a beneficial manner.      Time spent with the patient today was 30 minutes with greater than 50% of that time in counseling.  We will follow up with her following the above labs and likely schedule her for a subtotal parathyroidectomy at that point.       Adendum:  BMD dexa scan was WNL.  Sestamibi scan non-specific and non-localizing.  US revealed 2 hypoechoic foci posterior to R thyroid lobe (parathyroid glands vs LNs).  US also revealed left thyroid nodule which underwent FNA by Dr. Lorenzana which was benign.    Consented and scheduled for a subtotal parathyroidectomy on 9-19-18 with intra-op iPTH levels.  Will begin with exploration on R side given US findings.    If you have questions, please do not hesitate to call me. I look forward to following Archana along with you.    Sincerely,    Devin Nicholas MD   Medical Director, [facility]  Staff Attending Surgeon - Department of Surgery  [facility]  Associate Professor of Surgery  [employer]  [employer]    RLC/hcr    CC  MD Jesus Ladd MD Trac T. Le, MD Jorge C. Garces, MD

## 2018-07-30 DIAGNOSIS — E21.3 HYPERPARATHYROIDISM: Primary | ICD-10-CM

## 2018-08-01 NOTE — PROGRESS NOTES
CC:   Six monthly/Annual reassessment of kidney transplant candidacy.  Presents to discuss subtotal parathyroidectomy  Due to      HPI:  Ms. Ward is a 63 y.o. Black or  female with end-stage renal disease/advanced kidney disease secondary to HTN.  She has been on the wait list for a kidney transplant at [facility] since 5/22/2013. She is currently in active due to lack of compliance and  lack of caregiver support. Patient went through the neuro psych evaluation for memory impairement . She was found that she is not able to be candidate for kidney transplant without direct family/caregiver supervision. Pt has been listed for four years and has not presented with caregiver in person until today).  Dtr states she is entitled to FMLA but will not get paid if she does not work.  Patient states that she has to cancel her appointment due to lack of transportation. The daughter states that she can not take her to her appointment as she works.      Patient is currently on hemodialysis started on 5/2013. Patient is dialyzing on TTS schedule.  Patient reports that she is tolerating dialysis well.. She has a LUE AV fistula. Patient with h/o DCIS seen by Oncology and deemed suitable candidate for transplantation. Patient denies any recent ER visit, hospitalization or recent history of coronary artery disease, stroke, seizure disorder, chronic obstructive pulmonary disease. Patient states that he is doing well. she denies any CP, SOB, nausea, vomiting, diarrhea, abdominal pain, cough, fever, chills, weight loss.         Functional Status:  She walks 2-3 blocks without any symptoms of chest pain, SOB, claudication.   Previous Transplant: no  Previous Blood Transfusion: yes   Anticoagulation/ antiplatelet therapy and reason: no  Potential Donor: no           Past Medical History:          Past Medical History:   Diagnosis Date    Anemia of chronic renal failure 8/6/2014    Breast cancer      ESRD 2/2 HTN on HD since  5/22/13 8/6/2014    Hypertension - Dx in 30s 8/6/2014    Kidney transplant candidate 8/6/2014    Secondary hyperparathyroidism of renal origin 8/6/2014         Past Surgical History:            Past Surgical History:   Procedure Laterality Date    AV FISTULA PLACEMENT   2013    BREAST BIOPSY Bilateral       years ago    CHOLECYSTECTOMY   1980s    HYSTERECTOMY   1970's            Family History:            Family History   Problem Relation Age of Onset    Heart disease Father      Breast cancer Neg Hx      Cancer Neg Hx      Colon cancer Neg Hx      Ovarian cancer Neg Hx           Social History:  Social History   Social History                Social History    Marital status: Legally        Spouse name: N/A    Number of children: N/A    Years of education: N/A              Occupational History      Disabled                Social History Main Topics    Smoking status: Former Smoker       Packs/day: 0.50       Years: 10.00       Quit date: 8/6/2012    Smokeless tobacco: Never Used    Alcohol use No    Drug use: No    Sexual activity: No              Other Topics Concern    Not on file            Social History Narrative    No narrative on file                  Current Medication  Current Medications               Current Outpatient Prescriptions   Medication Sig Dispense Refill    aspirin 81 MG Chew Take 81 mg by mouth once daily.        calcium acetate (PHOSLO) 667 mg capsule TAKE 4 CAPSULES BY MOUTH THREE TIMES A DAY WITH MEALS AND TAKE 4 CAPSULES BY MOUTH TWO TIMES A DAY WITH SNACKS   6    SENSIPAR 60 mg Tab TAKE ONE TABLET BY MOUTH DAILY WITH EVENING MEAL OR AT BEDTIME   CHANGE IN DOSE   6    gabapentin (NEURONTIN) 100 MG capsule Take 100 mg by mouth 3 (three) times daily.        sevelamer carbonate (RENVELA) 800 mg Tab Take 800 mg by mouth 3 (three) times daily with meals.          No current facility-administered medications for this visit.                      Review of  Systems  Skin: no skin rash  CNS; no headaches, blurred vision, seizure, or syncope  ENT: No JVD,  Adenopathies,  nasal congestion. No oral lesions  Cardiac: No chest pain, dyspnea, claudication, edema or palpitations  Respiratory: No SOB, cough, hemoptysis   Gastro-intestinal: No diarrhea, constipation, abdominal pain, nausea, vomit. No ascitis  Genitourinary: no hematuria, dysuria, frequency, frequency  Musculoskeletal: joint pain, arthritis or vasculitic changes  Psych: alert awake, oriented, No cranial nerves deficit.     OBJECTIVE            Physical Exam      Head: normocephalic  Neck: No JVD, cervical axillary, or femoral adenopathies  Heart: no murmurs, Normal s1 and s2, No gallops, no rubs, No murmurs  Lungs; CTA, good respiratory effort, no crackles  Abdomen: soft, non tender, no splenomegaly or hepatomegaly, no massess, no bruits  Extremities: No edema, skin rash, joint pain. LUE AVF  SNC: awake, alert oriented. Cranial nerves are intact, no focalized, sensitivity and strength preserved        Labs:  Reviewed with the patient, calcium levels normal with increased iPTH c/w secondary hyperparathyroidism.        ASSESSMENT      1. Awaiting transplantation of kidney    2. Secondary hyperparathyroidism of renal origin    3. Anemia of chronic renal failure, stage 5    4. Ductal carcinoma in situ (DCIS) of left breast    5. ESRD 2/2 HTN on HD since 5/22/13          DENZEL Ward is a very pleasant 63-year-old woman with end-stage renal   disease and secondary hyperparathyroidism who presents referred by Transplant   and Nephrology for consideration of parathyroidectomy for her secondary   hyperparathyroidism due to chronic renal failure.  Her nephrologist is Dr. Arroyo on the [facility].  She also sees Dr. Alston.  The patient has seen   Transplant Medicine here for evaluation for kidney transplant.  I do not have   the records from Dr. Arroyo on the [facility].  She was not felt to be an    appropriate candidate for transplantation of this time for social reasons and   support mechanism as well as previously noted parathyroid hormone levels of   greater than 1000.  Her most recent intact parathyroid hormone level is 406 and   her calcium levels are normal.  She is on Sensipar.  She would like to be a   candidate for renal transplantation at some point and understands that surgical   improvement of her secondary hyperparathyroidism to bring her intact parathyroid   hormone levels down would benefit her long-term success for renal   transplantation.  She also is on Sensipar, the calcimimetic and states that that   drug is expensive as well.  At this point, we do not have recent labs in our   system.  I will order a repeat CMP, ionized calcium, vitamin D level and intact   parathyroid hormone level.  I will also order a sestamibi scan and neck,   ultrasound of the thyroid as well as bone mineral density scan and 24-hour urine   for calcium and creatinine to complete the workup since I do not have the   records here.  I will follow up with her following the scans and labs as noted   above and then likely schedule her for subtotal parathyroidectomy.  We discussed   the rationale for a subtotal parathyroidectomy leaving a small remnant of one   of the inferior glands in situ to maintain viability so that she does not   develop postoperative hypocalcemia and hypoparathyroidism.  We discussed leaving   a remnant that is approximately twice the size of one normal gland and she   understands the rationale and potential benefit in this leading to potential   renal transplantation and better control of her intact parathyroid hormone   levels to impact long-term patency of a graft in a beneficial manner.  Time   spent with the patient today was 30 minutes with greater than 50% of that time   in counseling.  We will follow up with her following the above labs and likely   schedule her for a subtotal  parathyroidectomy at that point.    I have personally taken the history and examined this patient and agree with the note as stated above from the last encounter.  BMD dexa scan was WNL.  Sestamibi scan non-specific and non-localizing.  US revealed 2 hypoechoic foci posterior to R thyroid lobe (parathyroid glands vs LNs)  US also revealed left thyroid nodule which underwent FNA by Dr. Lorenzana which was benign.  Consented and scheduled for a subtotal parathyroidectomy on 9-19-18 with intra-op iPTH levels.  Will begin with exploration on R side given US findings.

## 2018-08-20 ENCOUNTER — HOSPITAL ENCOUNTER (OUTPATIENT)
Dept: PREADMISSION TESTING | Facility: HOSPITAL | Age: 63
Discharge: HOME | End: 2018-08-20
Attending: ANESTHESIOLOGY
Payer: MEDICARE

## 2018-08-20 ENCOUNTER — ANESTHESIA EVENT (OUTPATIENT)
Dept: SURGERY | Facility: HOSPITAL | Age: 63
DRG: 674 | End: 2018-08-20
Payer: MEDICARE

## 2018-08-20 VITALS
OXYGEN SATURATION: 99 % | BODY MASS INDEX: 27.27 KG/M2 | HEART RATE: 67 BPM | SYSTOLIC BLOOD PRESSURE: 154 MMHG | HEIGHT: 62 IN | DIASTOLIC BLOOD PRESSURE: 73 MMHG | TEMPERATURE: 98 F | WEIGHT: 148.19 LBS | RESPIRATION RATE: 18 BRPM

## 2018-08-20 DIAGNOSIS — Z01.818 PREOP TESTING: Primary | ICD-10-CM

## 2018-08-20 NOTE — ANESTHESIA PREPROCEDURE EVALUATION
08/20/2018  Archana Ward is a 63 y.o., female with ESRD and secondary hyperparathyroidism here for surgical intervention.    Pre-operative evaluation for Procedure(s) (LRB):  PARATHYROIDECTOMY right subtotal with intera op pth levels (Right)        Patient Active Problem List   Diagnosis    ESRD 2/2 HTN on HD since 5/22/13    Kidney transplant candidate    Anemia of chronic renal failure    Secondary hyperparathyroidism of renal origin    Hypertension - Dx in 30s    Pre-transplant evaluation for chronic kidney disease    History of hysterectomy for benign disease    DCIS (ductal carcinoma in situ)    Hyperparathyroidism       Review of patient's allergies indicates:  No Known Allergies    No current facility-administered medications on file prior to encounter.      Current Outpatient Medications on File Prior to Encounter   Medication Sig Dispense Refill    calcium acetate (PHOSLO) 667 mg capsule TAKE 4 CAPSULES BY MOUTH THREE TIMES A DAY WITH MEALS AND TAKE 4 CAPSULES BY MOUTH TWO TIMES A DAY WITH SNACKS  6    SENSIPAR 60 mg Tab TAKE ONE TABLET BY MOUTH DAILY WITH EVENING MEAL OR AT BEDTIME   CHANGE IN DOSE  6    aspirin 81 MG Chew Take 81 mg by mouth once daily.         Past Surgical History:   Procedure Laterality Date    AV FISTULA PLACEMENT  2013    BREAST BIOPSY Bilateral     years ago    CHOLECYSTECTOMY  1980s    COLONOSCOPY N/A 11/3/2014    Performed by Gop[facility] Rosario [facility] at [facility]O    HYSTERECTOMY  1970's    LUMPECTOMY-BREAST with WIRE LOCALIZATION (TO BE INSERTED AT 8AM AT [facility]) LEFT BREAST  Left 11/4/2015    Performed by Rachel [facility]dana MD at [facility] OR [address] [place] [address]    LUMPECTOMY-BREAST-re-excision Left 12/16/2015    Performed by Rachel Saldana MD at [facility] OR [address] [place] [address]       Social History     Socioeconomic History    Marital status: Legally      Spouse name: Not on  file    Number of children: Not on file    Years of education: Not on file    Highest education level: Not on file   Social Needs    Financial resource strain: Not on file    Food insecurity - worry: Not on file    Food insecurity - inability: Not on file    Transportation needs - medical: Not on file    Transportation needs - non-medical: Not on file   Occupational History     Employer: Disabled   Tobacco Use    Smoking status: Former Smoker     Packs/day: 0.50     Years: 10.00     Pack years: 5.00     Last attempt to quit: 2012     Years since quittin.1    Smokeless tobacco: Never Used   Substance and Sexual Activity    Alcohol use: No     Alcohol/week: 0.0 oz    Drug use: No    Sexual activity: No   Other Topics Concern    Not on file   Social History Narrative    Not on file         CBC: No results for input(s): WBC, RBC, HGB, HCT, PLT, MCV, MCH, MCHC in the last 72 hours.    CMP: No results for input(s): NA, K, CL, CO2, BUN, CREATININE, GLU, MG, PHOS, CALCIUM, ALBUMIN, PROT, ALKPHOS, ALT, AST, BILITOT in the last 72 hours.    INR  No results for input(s): PT, INR, PROTIME, APTT in the last 72 hours.          2D Echo:  Results for orders placed or performed during the hospital encounter of 18   2D echo with color flow doppler   Result Value Ref Range    EF 55 55 - 65    Mitral Valve Regurgitation TRIVIAL TO MILD     Est. PA Systolic Pressure 44.24 (A)     Pericardial Effusion NONE     Mitral Valve Mobility NORMAL     Tricuspid Valve Regurgitation MODERATE (A)          Anesthesia Evaluation    I have reviewed the Patient Summary Reports.    I have reviewed the Nursing Notes.   I have reviewed the Medications.     Review of Systems  Anesthesia Hx:  No problems with previous Anesthesia  History of prior surgery of interest to airway management or planning: Previous anesthesia: MAC  lumpectomy 2015 with MAC.  Procedure performed at an [facility]. Denies Family Hx of Anesthesia  complications.    Social:  No Alcohol Use, Non-Smoker    Hematology/Oncology:  Hematology Normal       -- Cancer in past history (Left breast s/p lumpectomy, XRT):  Breast   EENT/Dental:  EENT/Dental Normal Glasses   Difficulty swallowing solids x 1 yr   Cardiovascular:   Hypertension  Functional Capacity good / => 4 METS, walks several blocks to stores, climb stairs in home; housework; denies CP, SOB  Valvular Heart Disease: (last ECHO 4/25/18) Mitral Regurgitation (MR), mild, Tricuspid Regurgitation (TR), moderate     Pulmonary:  Pulmonary Normal  Pulmonary Hypertension (on ECHO 4/25/18) Echocardiographic Estimated Pulmonary Artery Systolic Pressure >=35 - 45    Renal/:   Chronic Renal Disease, ESRD, Dialysis LUE AV fistula  TTS dialysis at [facility]  Dr Guru Connell-nephrologist    HD yesterday with no issues   Hepatic/GI:  Hepatic/GI Normal    Musculoskeletal:  Musculoskeletal Normal    Neurological:  Neurology Normal TIA, (2014) Denies Seizures.  Denies Pain    Endocrine:  Endocrine Normal    Dermatological:  Skin Normal    Psych:  Psychiatric Normal           Physical Exam  General:  Well nourished    Airway/Jaw/Neck:  Airway Findings: Mouth Opening: Normal Tongue: Normal  General Airway Assessment: Adult  Jaw/Neck Findings:     Neck ROM: Normal ROM      Dental:  Dental Findings: Edentulous   Chest/Lungs:  Chest/Lungs Findings: Clear to auscultation, Normal Respiratory Rate     Heart/Vascular:  Heart Findings: Rate: Normal  Rhythm: Regular Rhythm  Heart Murmur  Systolic  Systolic Heart Murmur Description: L Upper Sternal Border  Systolic Heart Murmur Grade: Grade II  Vascular Findings:  Dialysis Access: Left Upper Arm Graft      Musculoskeletal:  Musculoskeletal Findings:    Skin:  Skin Findings:     Mental Status:  Mental Status Findings:  Cooperative, Alert and Oriented       Pt was seen in POC 8/20/18; LIMB ALERT LUE: NO BP, IV OR LAB DRAW (AV FISTULA) /Ellen Abad RN        Anesthesia  Plan  Type of Anesthesia, risks & benefits discussed:  Anesthesia Type:  general  Patient's Preference: General  Intra-op Monitoring Plan: standard ASA monitors  Intra-op Monitoring Plan Comments:   Post Op Pain Control Plan:   Post Op Pain Control Plan Comments:   Induction:   IV  Beta Blocker:  Patient is not currently on a Beta-Blocker (No further documentation required).       Informed Consent: Patient understands risks and agrees with Anesthesia plan.  Questions answered. Anesthesia consent signed with patient.  ASA Score: 4     Day of Surgery Review of History & Physical:    H&P update referred to the surgeon.     Anesthesia Plan Notes: Discussed plan for general endotracheal anesthesia, pt understands and agrees with plan        Ready For Surgery From Anesthesia Perspective.

## 2018-08-20 NOTE — DISCHARGE INSTRUCTIONS
Your surgery has been scheduled for:__________________________________________    You should report to:  ____[facility], located on the [place] side of the first floor of the           [facility] ([phone])  ____[facility], located on the [facility] side of the            Second floor of the [facility] ([phone])  ____3rd Floor [facility] located on the [facility] side of the [facility] [phone]  Please Note   - Tell your doctor if you take Aspirin, products containing Aspirin, herbal medications  or blood thinners, such as Coumadin, Ticlid, or Plavix.  (Consult your provider regarding holding or stopping before surgery).  - Arrange for someone to drive you home following surgery.  You will not be allowed to leave the surgical facility alone or drive yourself home following sedation and anesthesia.  Before Surgery  - Stop taking all herbal medications 14days prior to surgery  - No Motrin/Advil (Ibuprofen) 7 days before surgery  - No Aleve (Naproxen) 7 days before surgery  - Stop Taking Asprin, products containing Asprin _____days before surgery  - Stop taking blood thinners_______days before surgery  - No Goody's/BC  Powder 7 days before surgery  - Refrain from drinking alcoholic beverages for 24hours before and after surgery  - Stop or limit smoking _________days before surgery  - You may take Tylenol for pain  Night before Surgery  NOTHING TO EAT OR DRINK AFTER MIDNIGHT (OR FOLLOW SURGEON'S INSTRUCTIONS)  - Take a shower or bath (shower is recommended).  Bathe with Hibiclens soap or an antibacterial soap from the neck down.  If not supplied by your surgeon, hibiclens soap will need to be purchased over the counter in pharmacy.  Rinse soap off thoroughly.  - Shampoo your hair with your regular shampoo  The Day of Surgery  ·  If you are told to take medication on the morning of surgery, it may be taken with  a sip of water.   - Take another bath or shower with hibiclens or any antibacterial soap, to reduce the chance of infection.  - Take heart and blood pressure medications with a small sip of water, as advised by the perioperative team.  - Do not take fluid pills  - You may brush your teeth and rinse your mouth, but do not swall any additional water.   - Do not apply perfumes, powder, body lotions or deodorant on the day of surgery.  - Nail polish should be removed.  - Do not wear makeup or moisturizer  - Wear comfortable clothes, such as a button front shirt and loose fitting pants.  - Leave all jewelry, including body piercings, and valuables at home.    - Bring any devices you will neeed after surgery such as crutches or canes.  - If you have sleep apnea, please bring your CPAP machine  In the event that your physical condition changes including the onset of a cold or respiratory illness, or if you have to delay or cancel your surgery, please notify your surgeon.  Anesthesia: General Anesthesia  Youre due to have surgery. During surgery, youll be given medication called anesthesia. (It is also called anesthetic.) This will keep you comfortable and pain-free. Your anesthesia provider will use general anesthesia. This sheet tells you more about it.  What is general anesthesia?     You are watched continuously during your procedure by the anesthesia provider   General anesthesia puts you into a state like deep sleep. It goes into the bloodstream (IV anesthetics), into the lungs (gas anesthetics), or both. You feel nothing during the procedure. You will not remember it. During the procedure, the anesthesia provider monitors you continuously. He or she checks your heart rate and rhythm, blood pressure, breathing, and blood oxygen.  · IV Anesthetics. IV anesthetics are given through an IV line in your arm. Theyre often given first. This is so you are asleep before a gas anesthetic is started. Some kinds of IV  anesthetics relieve pain. Others relax you. Your doctor will decide which kind is best in your case.  · Gas Anesthetics. Gas anesthetics are breathed into the lungs. They are often used to keep you asleep. They can be given through a facemask or a tube placed in your larynx or trachea (breathing tube).  ? If you have a facemask, your anesthesia provider will most likely place it over your nose and mouth while youre still awake. Youll breathe oxygen through the mask as your IV anesthetic is started. Gas anesthetic may be added through the mask.  ? If you have a tube in the larynx or trachea, it will be inserted into your throat after youre asleep.  Anesthesia tools and medications  You will likely have:  · IV anesthetics. These are put into an IV line into your bloodstream.  · Gas anesthetics. You breathe these anesthetics into your lungs, where they pass into your bloodstream.  · Pulse oximeter. This is a small clip that is attached to the end of your finger. This measures your blood oxygen level.  · Electrocardiography leads (electrodes). These are small sticky pads that are placed on your chest. They record your heart rate and rhythm.  · Blood pressure cuff. This reads your blood pressure.  Risks and possible complications  General anesthesia has some risks. These include:  · Breathing problems  · Nausea and vomiting  · Sore throat or hoarseness (usually temporary)  · Allergic reaction to the anesthetic  · Irregular heartbeat (rare)  · Cardiac arrest (rare)   Anesthesia safety  · Follow all instructions you are given for how long not to eat or drink before your procedure.  · Be sure your doctor knows what medications and drugs you take. This includes over-the-counter medications, herbs, supplements, alcohol or other drugs. You will be asked when those were last taken.  · Have an adult family member or friend drive you home after the procedure.  · For the first 24 hours after your surgery:  ? Do not drive or use  heavy equipment.  ? Have a trusted family member or spouse make important decisions or sign documents.  ? Avoid alcohol.  ? Have a responsible adult stay with you. He or she can watch for problems and help keep you safe.  Date Last Reviewed: 10/16/2014  © 6402-7245 [employer]. [address] [place] [address]. All rights reserved. This information is not intended as a substitute for professional medical care. Always follow your healthcare professional's instructions.

## 2018-09-18 ENCOUNTER — TELEPHONE (OUTPATIENT)
Dept: SURGERY | Facility: CLINIC | Age: 63
End: 2018-09-18

## 2018-09-18 NOTE — TELEPHONE ENCOUNTER
----- Message from Laury Price sent at 9/17/2018  3:40 PM CDT -----  Contact: Pt.Self   Pt. States she would like to speak with nurse in reference to getting surgery date and time please call Pt. Back @ [phone] Thank You

## 2018-09-18 NOTE — TELEPHONE ENCOUNTER
Instructed to shower twice with hibiclens (pm and am), no food after midnight but okay to have clear liquids 7 am.

## 2018-09-19 ENCOUNTER — ANESTHESIA (OUTPATIENT)
Dept: SURGERY | Facility: HOSPITAL | Age: 63
DRG: 674 | End: 2018-09-19
Payer: MEDICARE

## 2018-09-19 ENCOUNTER — HOSPITAL ENCOUNTER (INPATIENT)
Facility: HOSPITAL | Age: 63
LOS: 2 days | Discharge: HOME | DRG: 674 | End: 2018-09-22
Attending: SURGERY | Admitting: SURGERY
Payer: MEDICARE

## 2018-09-19 DIAGNOSIS — E21.3 HYPERPARATHYROIDISM: Primary | ICD-10-CM

## 2018-09-19 PROCEDURE — 37000009 HC ANESTHESIA EA ADD 15 MINS: Performed by: SURGERY

## 2018-09-19 PROCEDURE — 60699 UNLISTED PX ENDOCRINE SYSTEM: CPT | Mod: NTX,,, | Performed by: SURGERY

## 2018-09-19 PROCEDURE — G0379 DIRECT REFER HOSPITAL OBSERV: HCPCS

## 2018-09-19 PROCEDURE — 88305 TISSUE EXAM BY PATHOLOGIST: CPT | Performed by: PATHOLOGY

## 2018-09-19 PROCEDURE — 27201423 OPTIME MED/SURG SUP & DEVICES STERILE SUPPLY: Performed by: SURGERY

## 2018-09-19 PROCEDURE — 0GBG0ZX EXCISION OF LEFT THYROID GLAND LOBE, OPEN APPROACH, DIAGNOSTIC: ICD-10-PCS | Performed by: SURGERY

## 2018-09-19 PROCEDURE — 60500 EXPLORE PARATHYROID GLANDS: CPT | Mod: NTX,,, | Performed by: SURGERY

## 2018-09-19 PROCEDURE — 63600175 PHARM REV CODE 636 W HCPCS: Performed by: NURSE ANESTHETIST, CERTIFIED REGISTERED

## 2018-09-19 PROCEDURE — 25000003 PHARM REV CODE 250: Performed by: NURSE ANESTHETIST, CERTIFIED REGISTERED

## 2018-09-19 PROCEDURE — 88331 PATH CONSLTJ SURG 1 BLK 1SPC: CPT | Performed by: PATHOLOGY

## 2018-09-19 PROCEDURE — 63600175 PHARM REV CODE 636 W HCPCS: Performed by: STUDENT IN AN ORGANIZED HEALTH CARE EDUCATION/TRAINING PROGRAM

## 2018-09-19 PROCEDURE — 63600175 PHARM REV CODE 636 W HCPCS: Performed by: ANESTHESIOLOGY

## 2018-09-19 PROCEDURE — 71000033 HC RECOVERY, INTIAL HOUR: Performed by: SURGERY

## 2018-09-19 PROCEDURE — 94761 N-INVAS EAR/PLS OXIMETRY MLT: CPT

## 2018-09-19 PROCEDURE — 36000706: Performed by: SURGERY

## 2018-09-19 PROCEDURE — 71000039 HC RECOVERY, EACH ADD'L HOUR: Performed by: SURGERY

## 2018-09-19 PROCEDURE — 88305 TISSUE EXAM BY PATHOLOGIST: CPT | Mod: 26,,, | Performed by: PATHOLOGY

## 2018-09-19 PROCEDURE — 37000008 HC ANESTHESIA 1ST 15 MINUTES: Performed by: SURGERY

## 2018-09-19 PROCEDURE — 0GTQ0ZZ RESECTION OF MULTIPLE PARATHYROID GLANDS, OPEN APPROACH: ICD-10-PCS | Performed by: SURGERY

## 2018-09-19 PROCEDURE — 27000221 HC OXYGEN, UP TO 24 HOURS

## 2018-09-19 PROCEDURE — G0378 HOSPITAL OBSERVATION PER HR: HCPCS

## 2018-09-19 PROCEDURE — 25000003 PHARM REV CODE 250: Performed by: STUDENT IN AN ORGANIZED HEALTH CARE EDUCATION/TRAINING PROGRAM

## 2018-09-19 PROCEDURE — 88331 PATH CONSLTJ SURG 1 BLK 1SPC: CPT | Mod: 26,,, | Performed by: PATHOLOGY

## 2018-09-19 PROCEDURE — 36000707: Performed by: SURGERY

## 2018-09-19 PROCEDURE — D9220A PRA ANESTHESIA: Mod: ANES,,, | Performed by: ANESTHESIOLOGY

## 2018-09-19 PROCEDURE — D9220A PRA ANESTHESIA: Mod: CRNA,,, | Performed by: NURSE ANESTHETIST, CERTIFIED REGISTERED

## 2018-09-19 RX ORDER — CISATRACURIUM BESYLATE 10 MG/ML
INJECTION, SOLUTION INTRAVENOUS
Status: DISCONTINUED | OUTPATIENT
Start: 2018-09-19 | End: 2018-09-19

## 2018-09-19 RX ORDER — SODIUM CHLORIDE 9 MG/ML
INJECTION, SOLUTION INTRAVENOUS CONTINUOUS
Status: DISCONTINUED | OUTPATIENT
Start: 2018-09-19 | End: 2018-09-19

## 2018-09-19 RX ORDER — FENTANYL CITRATE 50 UG/ML
25 INJECTION, SOLUTION INTRAMUSCULAR; INTRAVENOUS EVERY 5 MIN PRN
Status: DISCONTINUED | OUTPATIENT
Start: 2018-09-19 | End: 2018-09-19 | Stop reason: HOSPADM

## 2018-09-19 RX ORDER — CEFAZOLIN SODIUM 1 G/3ML
2 INJECTION, POWDER, FOR SOLUTION INTRAMUSCULAR; INTRAVENOUS
Status: COMPLETED | OUTPATIENT
Start: 2018-09-19 | End: 2018-09-19

## 2018-09-19 RX ORDER — OXYCODONE AND ACETAMINOPHEN 10; 325 MG/1; MG/1
1 TABLET ORAL EVERY 4 HOURS PRN
Status: DISCONTINUED | OUTPATIENT
Start: 2018-09-19 | End: 2018-09-22 | Stop reason: HOSPADM

## 2018-09-19 RX ORDER — ONDANSETRON 2 MG/ML
INJECTION INTRAMUSCULAR; INTRAVENOUS
Status: DISCONTINUED | OUTPATIENT
Start: 2018-09-19 | End: 2018-09-19

## 2018-09-19 RX ORDER — MIDAZOLAM HYDROCHLORIDE 1 MG/ML
INJECTION, SOLUTION INTRAMUSCULAR; INTRAVENOUS
Status: DISCONTINUED | OUTPATIENT
Start: 2018-09-19 | End: 2018-09-19

## 2018-09-19 RX ORDER — LIDOCAINE HYDROCHLORIDE 10 MG/ML
1 INJECTION, SOLUTION EPIDURAL; INFILTRATION; INTRACAUDAL; PERINEURAL ONCE
Status: COMPLETED | OUTPATIENT
Start: 2018-09-19 | End: 2018-09-19

## 2018-09-19 RX ORDER — ONDANSETRON 2 MG/ML
4 INJECTION INTRAMUSCULAR; INTRAVENOUS EVERY 6 HOURS PRN
Status: DISCONTINUED | OUTPATIENT
Start: 2018-09-19 | End: 2018-09-22 | Stop reason: HOSPADM

## 2018-09-19 RX ORDER — SODIUM CHLORIDE 0.9 % (FLUSH) 0.9 %
3 SYRINGE (ML) INJECTION
Status: DISCONTINUED | OUTPATIENT
Start: 2018-09-19 | End: 2018-09-19

## 2018-09-19 RX ORDER — ONDANSETRON 8 MG/1
8 TABLET, ORALLY DISINTEGRATING ORAL EVERY 8 HOURS PRN
Status: DISCONTINUED | OUTPATIENT
Start: 2018-09-19 | End: 2018-09-19

## 2018-09-19 RX ORDER — GLYCOPYRROLATE 0.2 MG/ML
INJECTION INTRAMUSCULAR; INTRAVENOUS
Status: DISCONTINUED | OUTPATIENT
Start: 2018-09-19 | End: 2018-09-19

## 2018-09-19 RX ORDER — SODIUM CHLORIDE 9 MG/ML
INJECTION, SOLUTION INTRAVENOUS CONTINUOUS
Status: DISCONTINUED | OUTPATIENT
Start: 2018-09-19 | End: 2018-09-20

## 2018-09-19 RX ORDER — CALCIUM CARBONATE 500(1250)
1000 TABLET ORAL 2 TIMES DAILY
Status: DISCONTINUED | OUTPATIENT
Start: 2018-09-19 | End: 2018-09-20

## 2018-09-19 RX ORDER — PROPOFOL 10 MG/ML
VIAL (ML) INTRAVENOUS
Status: DISCONTINUED | OUTPATIENT
Start: 2018-09-19 | End: 2018-09-19

## 2018-09-19 RX ORDER — FENTANYL CITRATE 50 UG/ML
INJECTION, SOLUTION INTRAMUSCULAR; INTRAVENOUS
Status: DISCONTINUED | OUTPATIENT
Start: 2018-09-19 | End: 2018-09-19

## 2018-09-19 RX ORDER — ONDANSETRON 2 MG/ML
4 INJECTION INTRAMUSCULAR; INTRAVENOUS DAILY PRN
Status: DISCONTINUED | OUTPATIENT
Start: 2018-09-19 | End: 2018-09-19 | Stop reason: HOSPADM

## 2018-09-19 RX ORDER — NEOSTIGMINE METHYLSULFATE 1 MG/ML
INJECTION, SOLUTION INTRAVENOUS
Status: DISCONTINUED | OUTPATIENT
Start: 2018-09-19 | End: 2018-09-19

## 2018-09-19 RX ORDER — OXYCODONE AND ACETAMINOPHEN 5; 325 MG/1; MG/1
1 TABLET ORAL EVERY 4 HOURS PRN
Status: DISCONTINUED | OUTPATIENT
Start: 2018-09-19 | End: 2018-09-22 | Stop reason: HOSPADM

## 2018-09-19 RX ORDER — LIDOCAINE HCL/PF 100 MG/5ML
SYRINGE (ML) INTRAVENOUS
Status: DISCONTINUED | OUTPATIENT
Start: 2018-09-19 | End: 2018-09-19

## 2018-09-19 RX ADMIN — LIDOCAINE HYDROCHLORIDE 80 MG: 20 INJECTION, SOLUTION INTRAVENOUS at 12:09

## 2018-09-19 RX ADMIN — PROPOFOL 30 MG: 10 INJECTION, EMULSION INTRAVENOUS at 01:09

## 2018-09-19 RX ADMIN — NEOSTIGMINE METHYLSULFATE 5 MG: 1 INJECTION INTRAVENOUS at 03:09

## 2018-09-19 RX ADMIN — FENTANYL CITRATE 50 MCG: 50 INJECTION, SOLUTION INTRAMUSCULAR; INTRAVENOUS at 01:09

## 2018-09-19 RX ADMIN — OXYCODONE HYDROCHLORIDE AND ACETAMINOPHEN 1 TABLET: 5; 325 TABLET ORAL at 08:09

## 2018-09-19 RX ADMIN — CEFAZOLIN 2 G: 330 INJECTION, POWDER, FOR SOLUTION INTRAMUSCULAR; INTRAVENOUS at 12:09

## 2018-09-19 RX ADMIN — SODIUM CHLORIDE: 0.9 INJECTION, SOLUTION INTRAVENOUS at 11:09

## 2018-09-19 RX ADMIN — MIDAZOLAM HYDROCHLORIDE 1 MG: 1 INJECTION, SOLUTION INTRAMUSCULAR; INTRAVENOUS at 12:09

## 2018-09-19 RX ADMIN — CISATRACURIUM BESYLATE 7 MG: 10 INJECTION INTRAVENOUS at 01:09

## 2018-09-19 RX ADMIN — FENTANYL CITRATE 50 MCG: 50 INJECTION, SOLUTION INTRAMUSCULAR; INTRAVENOUS at 12:09

## 2018-09-19 RX ADMIN — FENTANYL CITRATE 25 MCG: 50 INJECTION INTRAMUSCULAR; INTRAVENOUS at 03:09

## 2018-09-19 RX ADMIN — ONDANSETRON 4 MG: 2 INJECTION INTRAMUSCULAR; INTRAVENOUS at 02:09

## 2018-09-19 RX ADMIN — CISATRACURIUM BESYLATE 13 MG: 10 INJECTION INTRAVENOUS at 12:09

## 2018-09-19 RX ADMIN — CISATRACURIUM BESYLATE 2 MG: 10 INJECTION INTRAVENOUS at 02:09

## 2018-09-19 RX ADMIN — CALCIUM 1000 MG: 500 TABLET ORAL at 08:09

## 2018-09-19 RX ADMIN — GLYCOPYRROLATE 0.6 MG: 0.2 INJECTION, SOLUTION INTRAMUSCULAR; INTRAVENOUS at 03:09

## 2018-09-19 RX ADMIN — SODIUM CHLORIDE: 0.9 INJECTION, SOLUTION INTRAVENOUS at 03:09

## 2018-09-19 RX ADMIN — OXYCODONE HYDROCHLORIDE AND ACETAMINOPHEN 1 TABLET: 10; 325 TABLET ORAL at 03:09

## 2018-09-19 RX ADMIN — LIDOCAINE HYDROCHLORIDE 1 MG: 10 INJECTION, SOLUTION EPIDURAL; INFILTRATION; INTRACAUDAL at 11:09

## 2018-09-19 RX ADMIN — PROPOFOL 130 MG: 10 INJECTION, EMULSION INTRAVENOUS at 12:09

## 2018-09-19 NOTE — BRIEF OP NOTE
[facility]  Surgery Department  Brief Op Note    SUMMARY     Date of Procedure: 9/19/2018     Procedure: Procedure(s):  PARATHYROIDECTOMY, SUBTOTAL     Surgeon(s) and Role:     * Devin Nicholas MD - Primary     * Kaiden Zabala MD - Resident - Assisting      Pre-Operative Diagnosis: Hyperparathyroidism [E21.3]    Post-Operative Diagnosis: Post-Op Diagnosis Codes:     * Hyperparathyroidism [E21.3]    Anesthesia: GETA    Technical Procedures Used: 3.5 gland subtotal parathyroidectomy.  Left upper gland remnant marked with prolene suture and clips.  Left lower thyroid nodule biopsied, negative for malignancy on frozen section    Description of the Findings of the Procedure: See op note    Significant Surgical Tasks Conducted by the Assistant(s), if Applicable: N/a    Complications: No    Estimated Blood Loss (EBL): 20mL           Implants: * No implants in log *    Specimens:   Specimen (12h ago, onward)    Start     Ordered    09/19/18 1452  Specimen to Pathology - Surgery  Once     Comments:  1. Right upper parathyroid gland; frozen2. Right lower parathyroid gland; frozen3. Left lower parathyroid gland; frozen4. Left thyroid biopsy; frozen5. Left upper parathyroid biopsy; frozen     Start Status   09/19/18 1452 Collected (09/19/18 1453)       09/19/18 1453                  Condition: Good    Disposition: PACU - hemodynamically stable.    Attestation: I was present and scrubbed for the entire procedure.

## 2018-09-19 NOTE — H&P
CC:   Six monthly/Annual reassessment of kidney transplant candidacy.  Presents to discuss subtotal parathyroidectomy  Due to      HPI:  Ms. Ward is a 63 y.o. Black or  female with end-stage renal disease/advanced kidney disease secondary to HTN.  She has been on the wait list for a kidney transplant at [facility] since 5/22/2013. She is currently in active due to lack of compliance and  lack of caregiver support. Patient went through the neuro psych evaluation for memory impairement . She was found that she is not able to be candidate for kidney transplant without direct family/caregiver supervision. Pt has been listed for four years and has not presented with caregiver in person until today).  Dtr states she is entitled to FMLA but will not get paid if she does not work.  Patient states that she has to cancel her appointment due to lack of transportation. The daughter states that she can not take her to her appointment as she works.      Patient is currently on hemodialysis started on 5/2013. Patient is dialyzing on TTS schedule.  Patient reports that she is tolerating dialysis well.. She has a LUE AV fistula. Patient with h/o DCIS seen by Oncology and deemed suitable candidate for transplantation. Patient denies any recent ER visit, hospitalization or recent history of coronary artery disease, stroke, seizure disorder, chronic obstructive pulmonary disease. Patient states that he is doing well. she denies any CP, SOB, nausea, vomiting, diarrhea, abdominal pain, cough, fever, chills, weight loss.         Functional Status:  She walks 2-3 blocks without any symptoms of chest pain, SOB, claudication.   Previous Transplant: no  Previous Blood Transfusion: yes   Anticoagulation/ antiplatelet therapy and reason: no  Potential Donor: no           Past Medical History:          Past Medical History:   Diagnosis Date    Anemia of chronic renal failure 8/6/2014    Breast cancer      ESRD 2/2 HTN on HD since  5/22/13 8/6/2014    Hypertension - Dx in 30s 8/6/2014    Kidney transplant candidate 8/6/2014    Secondary hyperparathyroidism of renal origin 8/6/2014         Past Surgical History:            Past Surgical History:   Procedure Laterality Date    AV FISTULA PLACEMENT   2013    BREAST BIOPSY Bilateral       years ago    CHOLECYSTECTOMY   1980s    HYSTERECTOMY   1970's            Family History:            Family History   Problem Relation Age of Onset    Heart disease Father      Breast cancer Neg Hx      Cancer Neg Hx      Colon cancer Neg Hx      Ovarian cancer Neg Hx           Social History:  Social History   Social History                Social History    Marital status: Legally        Spouse name: N/A    Number of children: N/A    Years of education: N/A              Occupational History      Disabled                Social History Main Topics    Smoking status: Former Smoker       Packs/day: 0.50       Years: 10.00       Quit date: 8/6/2012    Smokeless tobacco: Never Used    Alcohol use No    Drug use: No    Sexual activity: No              Other Topics Concern    Not on file            Social History Narrative    No narrative on file                  Current Medication  Current Medications               Current Outpatient Prescriptions   Medication Sig Dispense Refill    aspirin 81 MG Chew Take 81 mg by mouth once daily.        calcium acetate (PHOSLO) 667 mg capsule TAKE 4 CAPSULES BY MOUTH THREE TIMES A DAY WITH MEALS AND TAKE 4 CAPSULES BY MOUTH TWO TIMES A DAY WITH SNACKS   6    SENSIPAR 60 mg Tab TAKE ONE TABLET BY MOUTH DAILY WITH EVENING MEAL OR AT BEDTIME   CHANGE IN DOSE   6    gabapentin (NEURONTIN) 100 MG capsule Take 100 mg by mouth 3 (three) times daily.        sevelamer carbonate (RENVELA) 800 mg Tab Take 800 mg by mouth 3 (three) times daily with meals.          No current facility-administered medications for this visit.                      Review of  Systems  Skin: no skin rash  CNS; no headaches, blurred vision, seizure, or syncope  ENT: No JVD,  Adenopathies,  nasal congestion. No oral lesions  Cardiac: No chest pain, dyspnea, claudication, edema or palpitations  Respiratory: No SOB, cough, hemoptysis   Gastro-intestinal: No diarrhea, constipation, abdominal pain, nausea, vomit. No ascitis  Genitourinary: no hematuria, dysuria, frequency, frequency  Musculoskeletal: joint pain, arthritis or vasculitic changes  Psych: alert awake, oriented, No cranial nerves deficit.     OBJECTIVE            Physical Exam      Head: normocephalic  Neck: No JVD, cervical axillary, or femoral adenopathies  Heart: no murmurs, Normal s1 and s2, No gallops, no rubs, No murmurs  Lungs; CTA, good respiratory effort, no crackles  Abdomen: soft, non tender, no splenomegaly or hepatomegaly, no massess, no bruits  Extremities: No edema, skin rash, joint pain. LUE AVF  SNC: awake, alert oriented. Cranial nerves are intact, no focalized, sensitivity and strength preserved        Labs:  Reviewed with the patient, calcium levels normal with increased iPTH c/w secondary hyperparathyroidism.        ASSESSMENT      1. Awaiting transplantation of kidney    2. Secondary hyperparathyroidism of renal origin    3. Anemia of chronic renal failure, stage 5    4. Ductal carcinoma in situ (DCIS) of left breast    5. ESRD 2/2 HTN on HD since 5/22/13          DENZEL Ward is a very pleasant 63-year-old woman with end-stage renal   disease and secondary hyperparathyroidism who presents referred by Transplant   and Nephrology for consideration of parathyroidectomy for her secondary   hyperparathyroidism due to chronic renal failure.  Her nephrologist is Dr. Arroyo on the [facility].  She also sees Dr. Alston.  The patient has seen   Transplant Medicine here for evaluation for kidney transplant.  I do not have   the records from Dr. Arroyo on the [facility].  She was not felt to be an    appropriate candidate for transplantation of this time for social reasons and   support mechanism as well as previously noted parathyroid hormone levels of   greater than 1000.  Her most recent intact parathyroid hormone level is 406 and   her calcium levels are normal.  She is on Sensipar.  She would like to be a   candidate for renal transplantation at some point and understands that surgical   improvement of her secondary hyperparathyroidism to bring her intact parathyroid   hormone levels down would benefit her long-term success for renal   transplantation.  She also is on Sensipar, the calcimimetic and states that that   drug is expensive as well.  At this point, we do not have recent labs in our   system.  I will order a repeat CMP, ionized calcium, vitamin D level and intact   parathyroid hormone level.  I will also order a sestamibi scan and neck,   ultrasound of the thyroid as well as bone mineral density scan and 24-hour urine   for calcium and creatinine to complete the workup since I do not have the   records here.  I will follow up with her following the scans and labs as noted   above and then likely schedule her for subtotal parathyroidectomy.  We discussed   the rationale for a subtotal parathyroidectomy leaving a small remnant of one   of the inferior glands in situ to maintain viability so that she does not   develop postoperative hypocalcemia and hypoparathyroidism.  We discussed leaving   a remnant that is approximately twice the size of one normal gland and she   understands the rationale and potential benefit in this leading to potential   renal transplantation and better control of her intact parathyroid hormone   levels to impact long-term patency of a graft in a beneficial manner.  Time   spent with the patient today was 30 minutes with greater than 50% of that time   in counseling.  We will follow up with her following the above labs and likely   schedule her for a subtotal  parathyroidectomy at that point.     I have personally taken the history and examined this patient and agree with the note as stated above from the last encounter.  BMD dexa scan was WNL.  Sestamibi scan non-specific and non-localizing.  US revealed 2 hypoechoic foci posterior to R thyroid lobe (parathyroid glands vs LNs)  US also revealed left thyroid nodule which underwent FNA by Dr. Lorenzana which was benign.  Consented and scheduled for a subtotal parathyroidectomy on 9-19-18 with intra-op iPTH levels.  Will begin with exploration on R side given US findings.    No acute interval changes.

## 2018-09-19 NOTE — INTERVAL H&P NOTE
The patient has been examined and the H&P has been reviewed:    No acute interval changes.    Anesthesia/Surgery risks, benefits and alternative options discussed and understood by patient/family.          Active Hospital Problems    Diagnosis  POA    Hyperparathyroidism [E21.3]  Yes      Resolved Hospital Problems   No resolved problems to display.

## 2018-09-19 NOTE — TRANSFER OF CARE
"Anesthesia Transfer of Care Note    Patient: Archana Ward    Procedure(s) Performed: Procedure(s):  PARATHYROIDECTOMY, SUBTOTAL    Patient location: PACU    Anesthesia Type: general    Transport from OR: Transported from OR on 6-10 L/min O2 by face mask with adequate spontaneous ventilation    Post pain: adequate analgesia    Post assessment: no apparent anesthetic complications and tolerated procedure well    Post vital signs: stable    Level of consciousness: awake and alert    Nausea/Vomiting: no nausea/vomiting    Complications: none    Transfer of care protocol was followed      Last vitals:   Visit Vitals  BP (!) 141/69 (BP Location: Right arm, Patient Position: Lying)   Pulse 86   Temp 36.6 °C (97.9 °F) (Temporal)   Resp 20   Ht 5' 2" (1.575 m)   Wt 65.8 kg (145 lb)   LMP  (LMP Unknown)   SpO2 100%   Breastfeeding? No   BMI 26.52 kg/m²     "

## 2018-09-19 NOTE — NURSING TRANSFER
Nursing Transfer Note      9/19/2018     Transfer To: 525B    Transfer via stretcher    Transported by PCT    Medicines sent: N/A    Chart send with patient: Yes    Notified: daughter    Patient reassessed at: 9/19/18 @ 0673

## 2018-09-20 LAB
ALBUMIN SERPL BCP-MCNC: 3.2 G/DL
ANION GAP SERPL CALC-SCNC: 16 MMOL/L
BASOPHILS # BLD AUTO: 0.04 K/UL
BASOPHILS NFR BLD: 0.4 %
BUN SERPL-MCNC: 76 MG/DL
CA-I BLDV-SCNC: 0.88 MMOL/L
CA-I BLDV-SCNC: 1.08 MMOL/L
CALCIUM SERPL-MCNC: 7.2 MG/DL
CHLORIDE SERPL-SCNC: 99 MMOL/L
CO2 SERPL-SCNC: 23 MMOL/L
CREAT SERPL-MCNC: 11.6 MG/DL
DIFFERENTIAL METHOD: ABNORMAL
EOSINOPHIL # BLD AUTO: 0.1 K/UL
EOSINOPHIL NFR BLD: 1.4 %
ERYTHROCYTE [DISTWIDTH] IN BLOOD BY AUTOMATED COUNT: 14.6 %
EST. GFR  (AFRICAN AMERICAN): 3.5 ML/MIN/1.73 M^2
EST. GFR  (NON AFRICAN AMERICAN): 3.1 ML/MIN/1.73 M^2
GLUCOSE SERPL-MCNC: 83 MG/DL
HCT VFR BLD AUTO: 35.9 %
HGB BLD-MCNC: 10.7 G/DL
IMM GRANULOCYTES # BLD AUTO: 0.05 K/UL
IMM GRANULOCYTES NFR BLD AUTO: 0.6 %
LYMPHOCYTES # BLD AUTO: 1.5 K/UL
LYMPHOCYTES NFR BLD: 16.1 %
MCH RBC QN AUTO: 29.6 PG
MCHC RBC AUTO-ENTMCNC: 29.8 G/DL
MCV RBC AUTO: 99 FL
MONOCYTES # BLD AUTO: 1.2 K/UL
MONOCYTES NFR BLD: 13.2 %
NEUTROPHILS # BLD AUTO: 6.2 K/UL
NEUTROPHILS NFR BLD: 68.3 %
NRBC BLD-RTO: 0 /100 WBC
PHOSPHATE SERPL-MCNC: 5.1 MG/DL
PLATELET # BLD AUTO: 102 K/UL
PMV BLD AUTO: 11.3 FL
POCT GLUCOSE: 73 MG/DL (ref 70–110)
POTASSIUM SERPL-SCNC: 5 MMOL/L
PTH-INTACT SERPL-MCNC: 150 PG/ML
RBC # BLD AUTO: 3.61 M/UL
SODIUM SERPL-SCNC: 138 MMOL/L
WBC # BLD AUTO: 9.07 K/UL

## 2018-09-20 PROCEDURE — 36415 COLL VENOUS BLD VENIPUNCTURE: CPT

## 2018-09-20 PROCEDURE — 11000001 HC ACUTE MED/SURG PRIVATE ROOM

## 2018-09-20 PROCEDURE — 63600175 PHARM REV CODE 636 W HCPCS: Performed by: STUDENT IN AN ORGANIZED HEALTH CARE EDUCATION/TRAINING PROGRAM

## 2018-09-20 PROCEDURE — 85025 COMPLETE CBC W/AUTO DIFF WBC: CPT

## 2018-09-20 PROCEDURE — 82330 ASSAY OF CALCIUM: CPT

## 2018-09-20 PROCEDURE — 83970 ASSAY OF PARATHORMONE: CPT

## 2018-09-20 PROCEDURE — 25000003 PHARM REV CODE 250: Performed by: STUDENT IN AN ORGANIZED HEALTH CARE EDUCATION/TRAINING PROGRAM

## 2018-09-20 PROCEDURE — 80069 RENAL FUNCTION PANEL: CPT

## 2018-09-20 PROCEDURE — 90935 HEMODIALYSIS ONE EVALUATION: CPT

## 2018-09-20 PROCEDURE — 90935 HEMODIALYSIS ONE EVALUATION: CPT | Mod: ,,, | Performed by: NURSE PRACTITIONER

## 2018-09-20 PROCEDURE — 25000003 PHARM REV CODE 250: Performed by: NURSE PRACTITIONER

## 2018-09-20 RX ORDER — CALCIUM CARBONATE 500(1250)
1000 TABLET ORAL ONCE
Status: COMPLETED | OUTPATIENT
Start: 2018-09-20 | End: 2018-09-20

## 2018-09-20 RX ORDER — CALCIUM CARBONATE 500(1250)
1000 TABLET ORAL 3 TIMES DAILY
Status: DISCONTINUED | OUTPATIENT
Start: 2018-09-20 | End: 2018-09-20

## 2018-09-20 RX ORDER — HYDROMORPHONE HYDROCHLORIDE 1 MG/ML
0.2 INJECTION, SOLUTION INTRAMUSCULAR; INTRAVENOUS; SUBCUTANEOUS ONCE
Status: COMPLETED | OUTPATIENT
Start: 2018-09-20 | End: 2018-09-20

## 2018-09-20 RX ORDER — CALCIUM CARBONATE 500(1250)
2000 TABLET ORAL 3 TIMES DAILY
Status: DISCONTINUED | OUTPATIENT
Start: 2018-09-20 | End: 2018-09-22 | Stop reason: HOSPADM

## 2018-09-20 RX ORDER — OXYCODONE AND ACETAMINOPHEN 5; 325 MG/1; MG/1
1 TABLET ORAL EVERY 6 HOURS PRN
Qty: 30 TABLET | Refills: 0 | Status: SHIPPED | OUTPATIENT
Start: 2018-09-20 | End: 2019-09-12

## 2018-09-20 RX ORDER — SODIUM CHLORIDE 9 MG/ML
INJECTION, SOLUTION INTRAVENOUS
Status: DISCONTINUED | OUTPATIENT
Start: 2018-09-20 | End: 2018-09-21

## 2018-09-20 RX ORDER — SODIUM CHLORIDE 9 MG/ML
INJECTION, SOLUTION INTRAVENOUS ONCE
Status: COMPLETED | OUTPATIENT
Start: 2018-09-20 | End: 2018-09-20

## 2018-09-20 RX ADMIN — OXYCODONE HYDROCHLORIDE AND ACETAMINOPHEN 1 TABLET: 5; 325 TABLET ORAL at 06:09

## 2018-09-20 RX ADMIN — OXYCODONE HYDROCHLORIDE AND ACETAMINOPHEN 1 TABLET: 10; 325 TABLET ORAL at 11:09

## 2018-09-20 RX ADMIN — CALCIUM 2000 MG: 500 TABLET ORAL at 09:09

## 2018-09-20 RX ADMIN — OXYCODONE HYDROCHLORIDE AND ACETAMINOPHEN 1 TABLET: 5; 325 TABLET ORAL at 05:09

## 2018-09-20 RX ADMIN — Medication 0.2 MG: at 09:09

## 2018-09-20 RX ADMIN — OXYCODONE HYDROCHLORIDE AND ACETAMINOPHEN 1 TABLET: 5; 325 TABLET ORAL at 12:09

## 2018-09-20 RX ADMIN — SODIUM CHLORIDE 300 ML: 0.9 INJECTION, SOLUTION INTRAVENOUS at 01:09

## 2018-09-20 RX ADMIN — CALCIUM 1000 MG: 500 TABLET ORAL at 09:09

## 2018-09-20 NOTE — PROGRESS NOTES
IHD completed, blood returned. Pt alert & stable. Hemostasis 10 minutes, dry sterile dressing with bandaids applied to sites. Duration 3.0hrs, [id number]/min, Qd 800ml/min, UFG 2.9L. Report called. Pt transported to [facility] via stretcher.

## 2018-09-20 NOTE — HPI
The patient is a 63 y.o. AA female with a medical h/o ESRD (HD since 5/2013; KT candidate) secondary to HTN, anemia of chronic renal failure, secondary hyperparathyroidism of renal origin, and DCIS (ductal carcinoma in situ) admitted for a subtotal parathyroidectomy. Patient is currently on hemodialysis which was started on 5/2013. Patient is dialyzing on TTS schedule at [facility]. Her Nephrologist is Dr. Arroyo/Dr. Garvey. She has a LUE AV fistula. The normally dialyze for about 3.5 hrs and denies any recent missed or incomplete treatments. Patient denies any recent ER visits or hospitalizations. Patient is s/p subtotal parathyroidectomy on 9/19 and states that she is progressing well. She denies complaints of chest pain,shortness of breath, nausea, vomiting, diarrhea, abdominal pain, cough, fever/chills, or weight loss at this time.

## 2018-09-20 NOTE — ASSESSMENT & PLAN NOTE
Renal diet  PRN pain meds  Increase Ca to 1g TID, gave an additional Ca dose this AM after iCa low  Appreciate nephrology assistance with Ca management  Will get HD today

## 2018-09-20 NOTE — CONSULTS
[facility]  Nephrology  Consult Note    Patient Name: Archana Ward  MRN: [id number]  Admission Date: 9/19/2018  Hospital Length of Stay: 0 days  Attending Provider: Devin Nicholas MD   Primary Care Physician: Ashley Rodriguez MD  Principal Problem:Hyperparathyroidism    Inpatient consult to Nephrology  Consult performed by: Jacquelin Kelly NP  Consult ordered by: Kaiden aZbala MD  Reason for consult: ESRD and HD TTS        Subjective:     HPI: The patient is a 63 y.o. AA female with a medical h/o ESRD (HD since 5/2013; KT candidate) secondary to HTN, anemia of chronic renal failure, secondary hyperparathyroidism of renal origin, and DCIS (ductal carcinoma in situ) admitted for a subtotal parathyroidectomy. Patient is currently on hemodialysis which was started on 5/2013. Patient is dialyzing on TTS schedule at [facility]. Her Nephrologist is Dr. Arroyo/Dr. Garvey. She has a LUE AV fistula. The normally dialyze for about 3.5 hrs and denies any recent missed or incomplete treatments. Patient denies any recent ER visits or hospitalizations. Patient is s/p subtotal parathyroidectomy on 9/19 and states that she is progressing well. She denies complaints of chest pain,shortness of breath, nausea, vomiting, diarrhea, abdominal pain, cough, fever/chills, or weight loss at this time.     Nephrology consulted for management of ESRD and HD treatment.      Past Medical History:   Diagnosis Date    Anemia of chronic renal failure 8/6/2014    Breast cancer     ESRD 2/2 HTN on HD since 5/22/13 8/6/2014    Hypertension - Dx in 30s 8/6/2014    Kidney transplant candidate 8/6/2014    Secondary hyperparathyroidism of renal origin 8/6/2014       Past Surgical History:   Procedure Laterality Date    AV FISTULA PLACEMENT  2013    BREAST BIOPSY Bilateral     years ago    CHOLECYSTECTOMY  1980s    COLONOSCOPY N/A 11/3/2014    [facility]     HYSTERECTOMY  1970's    LUMPECTOMY-BREAST with WIRE LOCALIZATION (TO BE INSERTED AT 8AM AT [facility]) LEFT BREAST  Left 2015    Performed by Rachel Saldana MD at [facility] OR [address] [place] [address]    LUMPECTOMY-BREAST-re-excision Left 2015    Performed by Rachel Saldana MD at [facility] OR [address] [place] [address]       Review of patient's allergies indicates:  No Known Allergies  Current Facility-Administered Medications   Medication Frequency    0.9%  NaCl infusion PRN    0.9%  NaCl infusion Once    calcium carbonate (OS-HUDSON) tablet 500 mg TID    ondansetron injection 4 mg Q6H PRN    oxyCODONE-acetaminophen  mg per tablet 1 tablet Q4H PRN    oxyCODONE-acetaminophen 5-325 mg per tablet 1 tablet Q4H PRN     Family History     Problem Relation (Age of Onset)    Heart disease Father        Tobacco Use    Smoking status: Former Smoker     Packs/day: 0.50     Years: 10.00     Pack years: 5.00     Last attempt to quit: 2012     Years since quittin.1    Smokeless tobacco: Never Used   Substance and Sexual Activity    Alcohol use: No     Alcohol/week: 0.0 oz    Drug use: No    Sexual activity: No     Review of Systems   Constitutional: Negative for activity change, appetite change, fatigue and fever.   HENT: Negative for nosebleeds and sore throat.    Eyes: Negative for visual disturbance.   Respiratory: Negative for cough, chest tightness and shortness of breath.    Cardiovascular: Negative for chest pain, palpitations and leg swelling.   Gastrointestinal: Negative for abdominal pain, constipation, diarrhea, nausea and vomiting.   Genitourinary: Negative for difficulty urinating and urgency.        Oliguric (ESRD since )   Musculoskeletal: Negative for gait problem.   Skin: Positive for wound. Negative for rash.   Neurological: Positive for weakness. Negative for dizziness and headaches.   Psychiatric/Behavioral: Negative for confusion. The patient is not nervous/anxious.      Objective:     Vital Signs (Most Recent):  Temp:  98.8 °F (37.1 °C) (09/20/18 1043)  Pulse: 80 (09/20/18 1043)  Resp: 18 (09/20/18 1043)  BP: (!) 102/55 (09/20/18 1043)  SpO2: 98 % (09/20/18 1043)  O2 Device (Oxygen Therapy): room air (09/20/18 1043) Vital Signs (24h Range):  Temp:  [97 °F (36.1 °C)-98.8 °F (37.1 °C)] 98.8 °F (37.1 °C)  Pulse:  [] 80  Resp:  [12-26] 18  SpO2:  [95 %-100 %] 98 %  BP: (102-187)/() 102/55     Weight: 65.8 kg (145 lb) (09/19/18 1101)  Body mass index is 26.52 kg/m².  Body surface area is 1.7 meters squared.    I/O last 3 completed shifts:  In: 670 [P.O.:420; I.V.:250]  Out: -     Physical Exam   Constitutional: She is oriented to person, place, and time. She appears well-developed and well-nourished. No distress.   HENT:   Head: Normocephalic and atraumatic.   Neck: No JVD present.   Surgical incision c/d/i with dressing in place.  No signs of hematoma/seroma.   Cardiovascular: Normal rate and intact distal pulses.   Pulmonary/Chest: Effort normal and breath sounds normal. No respiratory distress. She has no wheezes. She has no rales.   Abdominal: Soft. Bowel sounds are normal. She exhibits no distension. There is no tenderness.   Musculoskeletal: Normal range of motion.   Neurological: She is alert and oriented to person, place, and time.   +hoarseness    Skin: Skin is warm and dry.   Psychiatric: She has a normal mood and affect. Her behavior is normal.             Assessment/Plan:     Patient is a 63 y.o. AA female with a medical h/o ESRD (HD since 5/2013; KT candidate) secondary to HTN, anemia of chronic renal failure, secondary hyperparathyroidism of renal origin, and DCIS admitted for a subtotal parathyroidectomy (9/19). Patient evaluated at bedside, no distress noted. Patient pending discharge after HD treatment per patient. Denies headaches, SOB, chest pain, abdominal pain, or muscle cramps. Will provide dialysis today for metabolic clearance and volume management per normal outpatient schedule.     *  Hyperparathyroidism    - Deferred to primary team  - s/p subtotal parathyroidectomy on 9/19  - ; Ionized Ca 0.88  - Receiving Ca to 1g TID  - Dialysate will be adjusted to current labs   - 3.0 Ca bath.         ESRD 2/2 HTN on HD since 5/22/13    Nephrology History  iHD Schedule: TTS  Unit/MD: ANGELA Weinberg/ Dr. Arroyo/Dr. Garvey  Duration: 3.5 hrs  UF: 1-1.5 L   EDW: ?  Access: LUE AVF  Resodial Renal Function: Oliguric     - Dialysate will be adjusted to current labs   - BFR: 400 mL/min  - Target ultrafiltration: ~1-1.5 L  - Avoid nephrotoxic medications  - Medications to renal parameters  - Strict Input and Output and chart  - Daily standing weights    HTN  - Controlled. Continue home BP regimen.   - Goal for BP is <140 mmHg SBP and BDP <90 mmHg, maintain MAP > 65.    Anemia of Chronic Kidney Disease   - Hbg 11.1.Goal in ESRD is Hgb of 10-11.     Mineral Bone Disease in CKD   - Continue renal diet.   - Recommend daily renal panel          Case discussed with staff further recs with attestation.    Thank you for your consult. I will follow-up with patient. Please contact us if you have any additional questions.    GRACIE Kelly, LEILA, APRN, FNP-C  Department of Nephrology  [facility]  Pager: [phone]    ATTENDING PHYSICIAN ATTESTATION  I have personally interviewed and examined the patient. I thoroughly reviewed the demographic, clinical, laboratorial and imaging information available in medical records. I agree with the assessment and recommendations provided by the SUBHA Kelly who was under my supervision.     HEMODIALYSIS NOTE  Patient evaluated while undergoing hemodialysis indicated for ESRD. Tolerating session with current UFR, no complications.

## 2018-09-20 NOTE — ASSESSMENT & PLAN NOTE
Nephrology History  iHD Schedule: TTS  Unit/MD: ANGELA Weinberg/ Dr. Arroyo/Dr. Garvey  Duration: 3.5 hrs  UF: 1-1.5 L   EDW: ?  Access: LUE AVF  Resodial Renal Function: Oliguric     - Will provide dialysis for metabolic clearance and volume management today as per patient's normal schedule.   - Dialysate will be adjusted to current labs   - BFR: 400 mL/min  - Target ultrafiltration: ~1-1.5 L  - Avoid nephrotoxic medications  - Medications to renal parameters  - Strict Input and Output and chart  - Daily standing weights    HTN  - Controlled. Continue home BP regimen.   - Goal for BP is <140 mmHg SBP and BDP <90 mmHg, maintain MAP > 65.    Anemia of Chronic Kidney Disease   - Hbg 11.1.Goal in ESRD is Hgb of 10-11.     Mineral Bone Disease in CKD   - Continue renal diet.   - Recommend daily renal panel

## 2018-09-20 NOTE — PLAN OF CARE
Patient is a 63 year old female admitted from home and underwent Subtotal Parathyroidectomy 9/19/2018.  Patient with low calcium today with Ca given and is expected to go to HD with high Ca bath.  Patient is expected to discharge home with no needs and resumption of HD [facility].    Patient lives in a two story apartment with her bed and bathroom on the first floor with her daughter.  Patient's family will drive her home and obtain anything she needs after discharge.  Will continue to follow for needs.    PCP  Ashley Rodriguez MD   [address] [place] [address] [address]VD [address] / [place] [id number] [phone]      [facility] [place] ([place], [place] - [address]  [address] [facility] ([place] [address]  Phone: [phone] Fax: [phone]      Extended Emergency Contact Information  Primary Emergency Contact: Monse Romero  Address: [address] [place] [address] of [place]  Home Phone: [phone]  Mobile Phone: [phone]  Relation: Sister       09/20/18 1459   Discharge Assessment   Assessment Type Discharge Planning Assessment   Confirmed/corrected address and phone number on facesheet? Yes   Assessment information obtained from? Patient   Expected Length of Stay (days) 3   Communicated expected length of stay with patient/caregiver yes   Prior to hospitilization cognitive status: Alert/Oriented   Prior to hospitalization functional status: Independent   Current cognitive status: Alert/Oriented   Current Functional Status: Independent   Lives With child(cm), adult   Able to Return to Prior Arrangements yes   Is patient able to care for self after discharge? Yes   Who are your caregiver(s) and their phone number(s)? family   Patient's perception of discharge disposition home or selfcare   Equipment Currently Used at Home none   Do you have any problems affording any of your prescribed medications? No   Is the patient taking  medications as prescribed? yes   Does the patient have transportation home? Yes   Transportation Available family or friend will provide   Dialysis [facility] and [facility] days [facility] [address]   Discharge Plan A Home with family   Discharge Plan B Home with family;Home Health   Patient/Family In Agreement With Plan yes

## 2018-09-20 NOTE — PROGRESS NOTES
Pt arrived to unit via stretcher. Pt alert & stable. Maintenance pt. Accessed LT AVF x2 15G needles without difficulty. Duration 3.0hrs, Qb 400ml/min, Qd 800 ml/min, planned UFG 1.5L, orders reviewed.

## 2018-09-20 NOTE — SUBJECTIVE & OBJECTIVE
Past Medical History:   Diagnosis Date    Anemia of chronic renal failure 2014    Breast cancer     ESRD 2/2 HTN on HD since 13    Hypertension - Dx in 30s 2014    Kidney transplant candidate 2014    Secondary hyperparathyroidism of renal origin 2014       Past Surgical History:   Procedure Laterality Date    AV FISTULA PLACEMENT      BREAST BIOPSY Bilateral     years ago    CHOLECYSTECTOMY  1980s    COLONOSCOPY N/A 11/3/2014    [facility]O    HYSTERECTOMY      LUMPECTOMY-BREAST with WIRE LOCALIZATION (TO BE INSERTED AT 8AM AT [facility]) LEFT BREAST  Left 2015    Performed by Rachel [facility]ana MD at [facility] OR [address] [place] [address]    LUMPECTOMY-BREAST-re-excision Left 2015    Performed by Rachel Saldana MD at [facility] OR [address] [place] [address]       Review of patient's allergies indicates:  No Known Allergies  Current Facility-Administered Medications   Medication Frequency    0.9%  NaCl infusion PRN    0.9%  NaCl infusion Once    calcium carbonate (OS-HUDSON) tablet 500 mg TID    ondansetron injection 4 mg Q6H PRN    oxyCODONE-acetaminophen  mg per tablet 1 tablet Q4H PRN    oxyCODONE-acetaminophen 5-325 mg per tablet 1 tablet Q4H PRN     Family History     Problem Relation (Age of Onset)    Heart disease Father        Tobacco Use    Smoking status: Former Smoker     Packs/day: 0.50     Years: 10.00     Pack years: 5.00     Last attempt to quit: 2012     Years since quittin.1    Smokeless tobacco: Never Used   Substance and Sexual Activity    Alcohol use: No     Alcohol/week: 0.0 oz    Drug use: No    Sexual activity: No     Review of Systems   Constitutional: Negative for activity change, appetite change, fatigue and fever.   HENT: Negative for nosebleeds and sore throat.    Eyes: Negative for visual disturbance.   Respiratory: Negative for cough, chest tightness and shortness of breath.    Cardiovascular: Negative for chest pain,  palpitations and leg swelling.   Gastrointestinal: Negative for abdominal pain, constipation, diarrhea, nausea and vomiting.   Genitourinary: Negative for difficulty urinating and urgency.        Oliguric (ESRD since 2013)   Musculoskeletal: Negative for gait problem.   Skin: Positive for wound. Negative for rash.   Neurological: Positive for weakness. Negative for dizziness and headaches.   Psychiatric/Behavioral: Negative for confusion. The patient is not nervous/anxious.      Objective:     Vital Signs (Most Recent):  Temp: 98.8 °F (37.1 °C) (09/20/18 1043)  Pulse: 80 (09/20/18 1043)  Resp: 18 (09/20/18 1043)  BP: (!) 102/55 (09/20/18 1043)  SpO2: 98 % (09/20/18 1043)  O2 Device (Oxygen Therapy): room air (09/20/18 1043) Vital Signs (24h Range):  Temp:  [97 °F (36.1 °C)-98.8 °F (37.1 °C)] 98.8 °F (37.1 °C)  Pulse:  [] 80  Resp:  [12-26] 18  SpO2:  [95 %-100 %] 98 %  BP: (102-187)/() 102/55     Weight: 65.8 kg (145 lb) (09/19/18 1101)  Body mass index is 26.52 kg/m².  Body surface area is 1.7 meters squared.    I/O last 3 completed shifts:  In: 670 [P.O.:420; I.V.:250]  Out: -     Physical Exam   Constitutional: She is oriented to person, place, and time. She appears well-developed and well-nourished. No distress.   HENT:   Head: Normocephalic and atraumatic.   Neck: No JVD present.   Surgical incision c/d/i with dressing in place.  No signs of hematoma/seroma.   Cardiovascular: Normal rate and intact distal pulses.   Pulmonary/Chest: Effort normal and breath sounds normal. No respiratory distress. She has no wheezes. She has no rales.   Abdominal: Soft. Bowel sounds are normal. She exhibits no distension. There is no tenderness.   Musculoskeletal: Normal range of motion.   Neurological: She is alert and oriented to person, place, and time.   +hoarseness    Skin: Skin is warm and dry.   Psychiatric: She has a normal mood and affect. Her behavior is normal.

## 2018-09-20 NOTE — ASSESSMENT & PLAN NOTE
- Deferred to primary team  - s/p subtotal parathyroidectomy on 9/19  - Receiving Ca to 1g TID  - Dialysate will be adjusted to current labs   - 3.0 Ca bath.

## 2018-09-20 NOTE — PROGRESS NOTES
Patient returned to floor from Dialysis.  Patient was dialyzed for 3 hours, 2.9L of fluid was taken off.   Blood glulcose 73, No bloody drainage from left upper arm fistula; positive was thrill and bruit.  Patient complaining of pain in her neck 9/10.  Oxycodone PO was administered.  Will continue to monitor.

## 2018-09-20 NOTE — SUBJECTIVE & OBJECTIVE
Interval History: NAEON.  Some pain with swallowing and talking.  HDS    Medications:  Continuous Infusions:  Scheduled Meds:   calcium carbonate  1,000 mg Oral Once    calcium carbonate  1,000 mg Oral TID     PRN Meds:ondansetron, oxyCODONE-acetaminophen, oxyCODONE-acetaminophen     Review of patient's allergies indicates:  No Known Allergies  Objective:     Vital Signs (Most Recent):  Temp: 98 °F (36.7 °C) (09/20/18 0555)  Pulse: 84 (09/20/18 0555)  Resp: 18 (09/20/18 0555)  BP: (!) 124/57 (09/20/18 0555)  SpO2: 100 % (09/20/18 0555) Vital Signs (24h Range):  Temp:  [97 °F (36.1 °C)-98.5 °F (36.9 °C)] 98 °F (36.7 °C)  Pulse:  [] 84  Resp:  [12-26] 18  SpO2:  [95 %-100 %] 100 %  BP: (124-187)/() 124/57     Weight: 65.8 kg (145 lb)  Body mass index is 26.52 kg/m².    Intake/Output - Last 3 Shifts       09/18 0700 - 09/19 0659 09/19 0700 - 09/20 0659 09/20 0700 - 09/21 0659    P.O.  420     I.V. (mL/kg)  250 (3.8)     Total Intake(mL/kg)  670 (10.2)     Net  +670            Urine Occurrence  1 x           Physical Exam   Constitutional: She is oriented to person, place, and time. She appears well-developed and well-nourished. No distress.   Neck:   Surgical incision c/d/i with dressing in place.  No signs of hematoma/seroma.  Soft.   Cardiovascular: Normal rate and intact distal pulses.   Pulmonary/Chest: Effort normal. No respiratory distress.   Abdominal: Soft. She exhibits no distension.   Neurological: She is alert and oriented to person, place, and time.   Hoarse voice        Significant Labs:    iCa  0.88    PTH  150

## 2018-09-20 NOTE — ANESTHESIA POSTPROCEDURE EVALUATION
"Anesthesia Post Evaluation    Patient: Archana Ward    Procedure(s) Performed: Procedure(s):  PARATHYROIDECTOMY, SUBTOTAL    Final Anesthesia Type: general  Patient location during evaluation: PACU  Patient participation: Yes- Able to Participate  Level of consciousness: awake and alert  Post-procedure vital signs: reviewed and stable  Pain management: adequate  Airway patency: patent  PONV status at discharge: No PONV  Anesthetic complications: no      Cardiovascular status: blood pressure returned to baseline  Respiratory status: unassisted  Hydration status: euvolemic  Follow-up not needed.        Visit Vitals  BP (!) 157/89 (BP Location: Right arm, Patient Position: Lying)   Pulse 87   Temp 36.3 °C (97.4 °F) (Oral)   Resp 18   Ht 5' 2" (1.575 m)   Wt 65.8 kg (145 lb)   LMP  (LMP Unknown)   SpO2 96%   Breastfeeding? No   BMI 26.52 kg/m²       Pain/Jigar Score: Pain Assessment Performed: Yes (9/19/2018  6:08 PM)  Presence of Pain: denies (9/19/2018  6:08 PM)  Pain Rating Prior to Med Admin: 3 (9/19/2018  6:08 PM)  Jigar Score: 9 (9/19/2018  5:40 PM)        "

## 2018-09-21 LAB
CA-I BLDV-SCNC: 0.93 MMOL/L
CA-I BLDV-SCNC: 0.96 MMOL/L

## 2018-09-21 PROCEDURE — 36415 COLL VENOUS BLD VENIPUNCTURE: CPT

## 2018-09-21 PROCEDURE — 11000001 HC ACUTE MED/SURG PRIVATE ROOM

## 2018-09-21 PROCEDURE — 82330 ASSAY OF CALCIUM: CPT

## 2018-09-21 PROCEDURE — 94761 N-INVAS EAR/PLS OXIMETRY MLT: CPT

## 2018-09-21 PROCEDURE — 25000003 PHARM REV CODE 250: Performed by: STUDENT IN AN ORGANIZED HEALTH CARE EDUCATION/TRAINING PROGRAM

## 2018-09-21 RX ORDER — CALCITRIOL 0.25 UG/1
0.25 CAPSULE ORAL DAILY
Status: DISCONTINUED | OUTPATIENT
Start: 2018-09-21 | End: 2018-09-22 | Stop reason: HOSPADM

## 2018-09-21 RX ORDER — SODIUM CHLORIDE 9 MG/ML
INJECTION, SOLUTION INTRAVENOUS ONCE
Status: COMPLETED | OUTPATIENT
Start: 2018-09-22 | End: 2018-09-22

## 2018-09-21 RX ORDER — SODIUM CHLORIDE 9 MG/ML
INJECTION, SOLUTION INTRAVENOUS
Status: DISCONTINUED | OUTPATIENT
Start: 2018-09-22 | End: 2018-09-22 | Stop reason: HOSPADM

## 2018-09-21 RX ADMIN — CALCIUM 2000 MG: 500 TABLET ORAL at 09:09

## 2018-09-21 RX ADMIN — OXYCODONE HYDROCHLORIDE AND ACETAMINOPHEN 1 TABLET: 10; 325 TABLET ORAL at 06:09

## 2018-09-21 RX ADMIN — OXYCODONE HYDROCHLORIDE AND ACETAMINOPHEN 1 TABLET: 10; 325 TABLET ORAL at 09:09

## 2018-09-21 RX ADMIN — CALCITRIOL 0.25 MCG: 0.25 CAPSULE ORAL at 09:09

## 2018-09-21 RX ADMIN — CALCIUM 2000 MG: 500 TABLET ORAL at 02:09

## 2018-09-21 NOTE — ASSESSMENT & PLAN NOTE
Renal diet  PRN pain meds  Increase Ca to 2g TID  Calcitriol 0.25mg daily  Appreciate nephrology assistance with ESRD  OOB/Ambulate   Recheck iCa this afternoon, possible d/c if stable

## 2018-09-21 NOTE — ASSESSMENT & PLAN NOTE
On TThS HD  HD yesterday with high Ca bath  If not discharged today, will need HD tomorrow as well   Appreciate nephrology assistance

## 2018-09-21 NOTE — SUBJECTIVE & OBJECTIVE
Interval History: NAEON.  Dialyzed yesterday.  Voice is stronger.  Still only eating pudding, jello, and juice.  Does not want to try solids yet    Medications:  Continuous Infusions:  Scheduled Meds:   calcitRIOL  0.25 mcg Oral Daily    calcium carbonate  2,000 mg Oral TID     PRN Meds:sodium chloride 0.9%, ondansetron, oxyCODONE-acetaminophen, oxyCODONE-acetaminophen     Review of patient's allergies indicates:  No Known Allergies  Objective:     Vital Signs (Most Recent):  Temp: 98 °F (36.7 °C) (09/21/18 0548)  Pulse: 73 (09/21/18 0548)  Resp: 18 (09/21/18 0548)  BP: 113/60 (09/21/18 0548)  SpO2: (!) 93 % (09/21/18 0548) Vital Signs (24h Range):  Temp:  [97 °F (36.1 °C)-99.9 °F (37.7 °C)] 98 °F (36.7 °C)  Pulse:  [67-95] 73  Resp:  [16-20] 18  SpO2:  [93 %-100 %] 93 %  BP: (102-159)/(55-74) 113/60     Weight: 65.8 kg (145 lb)  Body mass index is 26.52 kg/m².    Intake/Output - Last 3 Shifts       09/19 0700 - 09/20 0659 09/20 0700 - 09/21 0659 09/21 0700 - 09/22 0659    P.O. 420      I.V. (mL/kg) 250 (3.8)      Other  600     Total Intake(mL/kg) 670 (10.2) 600 (9.1)     Other  2150     Total Output  2150     Net +670 -1550            Urine Occurrence 1 x            Physical Exam   Constitutional: She is oriented to person, place, and time. She appears well-developed and well-nourished. No distress.   Neck:   Surgical incision c/d/i with dressing in place.  No signs of hematoma/seroma.  Soft.   Cardiovascular: Normal rate and intact distal pulses.   Pulmonary/Chest: Effort normal. No respiratory distress.   Abdominal: Soft. She exhibits no distension.   Neurological: She is alert and oriented to person, place, and time.   Hoarse voice        Significant Labs:    iCa  0.93    PTH  150

## 2018-09-21 NOTE — PROGRESS NOTES
[facility]  General Surgery  Progress Note    Subjective:     History of Present Illness:  No notes on file    Post-Op Info:  Procedure(s):  PARATHYROIDECTOMY, SUBTOTAL   2 Days Post-Op     Interval History: NAEON.  Dialyzed yesterday.  Voice is stronger.  Still only eating pudding, jello, and juice.  Does not want to try solids yet    Medications:  Continuous Infusions:  Scheduled Meds:   calcitRIOL  0.25 mcg Oral Daily    calcium carbonate  2,000 mg Oral TID     PRN Meds:sodium chloride 0.9%, ondansetron, oxyCODONE-acetaminophen, oxyCODONE-acetaminophen     Review of patient's allergies indicates:  No Known Allergies  Objective:     Vital Signs (Most Recent):  Temp: 98 °F (36.7 °C) (09/21/18 0548)  Pulse: 73 (09/21/18 0548)  Resp: 18 (09/21/18 0548)  BP: 113/60 (09/21/18 0548)  SpO2: (!) 93 % (09/21/18 0548) Vital Signs (24h Range):  Temp:  [97 °F (36.1 °C)-99.9 °F (37.7 °C)] 98 °F (36.7 °C)  Pulse:  [67-95] 73  Resp:  [16-20] 18  SpO2:  [93 %-100 %] 93 %  BP: (102-159)/(55-74) 113/60     Weight: 65.8 kg (145 lb)  Body mass index is 26.52 kg/m².    Intake/Output - Last 3 Shifts       09/19 0700 - 09/20 0659 09/20 0700 - 09/21 0659 09/21 0700 - 09/22 0659    P.O. 420      I.V. (mL/kg) 250 (3.8)      Other  600     Total Intake(mL/kg) 670 (10.2) 600 (9.1)     Other  2150     Total Output  2150     Net +670 -1550            Urine Occurrence 1 x            Physical Exam   Constitutional: She is oriented to person, place, and time. She appears well-developed and well-nourished. No distress.   Neck:   Surgical incision c/d/i with dressing in place.  No signs of hematoma/seroma.  Soft.   Cardiovascular: Normal rate and intact distal pulses.   Pulmonary/Chest: Effort normal. No respiratory distress.   Abdominal: Soft. She exhibits no distension.   Neurological: She is alert and oriented to person, place, and time.   Hoarse voice        Significant Labs:    iCa  0.93    PTH  150    Assessment/Plan:      * Hyperparathyroidism    Renal diet  PRN pain meds  Increase Ca to 2g TID  Calcitriol 0.25mg daily  Appreciate nephrology assistance with ESRD  OOB/Ambulate   Recheck iCa this afternoon, possible d/c if stable         ESRD 2/2 HTN on HD since 5/22/13    On [facility] HD  HD yesterday with high Ca bath  If not discharged today, will need HD tomorrow as well   Appreciate nephrology assistance             Kaiden Zabala MD  General Surgery  [facility]

## 2018-09-21 NOTE — PLAN OF CARE
Problem: Nutrition, Imbalanced: Inadequate Oral Intake (Adult)  Goal: Improved Oral Intake  Patient will demonstrate the desired outcomes by discharge/transition of care.  Pt eating some her food, prefers jello more, due to slight discomfort.

## 2018-09-22 VITALS
RESPIRATION RATE: 16 BRPM | HEIGHT: 62 IN | OXYGEN SATURATION: 95 % | SYSTOLIC BLOOD PRESSURE: 139 MMHG | HEART RATE: 84 BPM | DIASTOLIC BLOOD PRESSURE: 65 MMHG | TEMPERATURE: 98 F | BODY MASS INDEX: 26.68 KG/M2 | WEIGHT: 145 LBS

## 2018-09-22 LAB
ALBUMIN SERPL BCP-MCNC: 3.2 G/DL
ANION GAP SERPL CALC-SCNC: 11 MMOL/L
BUN SERPL-MCNC: 46 MG/DL
CA-I BLDV-SCNC: 1 MMOL/L
CALCIUM SERPL-MCNC: 8.5 MG/DL
CHLORIDE SERPL-SCNC: 102 MMOL/L
CO2 SERPL-SCNC: 24 MMOL/L
CREAT SERPL-MCNC: 10.2 MG/DL
EST. GFR  (AFRICAN AMERICAN): 4.1 ML/MIN/1.73 M^2
EST. GFR  (NON AFRICAN AMERICAN): 3.6 ML/MIN/1.73 M^2
GLUCOSE SERPL-MCNC: 78 MG/DL
PHOSPHATE SERPL-MCNC: 3.5 MG/DL
POTASSIUM SERPL-SCNC: 4.1 MMOL/L
SODIUM SERPL-SCNC: 137 MMOL/L

## 2018-09-22 PROCEDURE — 25000003 PHARM REV CODE 250: Performed by: STUDENT IN AN ORGANIZED HEALTH CARE EDUCATION/TRAINING PROGRAM

## 2018-09-22 PROCEDURE — 36415 COLL VENOUS BLD VENIPUNCTURE: CPT

## 2018-09-22 PROCEDURE — 90935 HEMODIALYSIS ONE EVALUATION: CPT

## 2018-09-22 PROCEDURE — 90935 HEMODIALYSIS ONE EVALUATION: CPT | Mod: ,,, | Performed by: INTERNAL MEDICINE

## 2018-09-22 PROCEDURE — 25000003 PHARM REV CODE 250: Performed by: NURSE PRACTITIONER

## 2018-09-22 PROCEDURE — 80069 RENAL FUNCTION PANEL: CPT

## 2018-09-22 PROCEDURE — 82330 ASSAY OF CALCIUM: CPT

## 2018-09-22 RX ORDER — CALCIUM CARBONATE 500(1250)
2000 TABLET ORAL 3 TIMES DAILY
Qty: 45 TABLET | Refills: 0 | Status: ON HOLD | OUTPATIENT
Start: 2018-09-22 | End: 2021-12-07 | Stop reason: HOSPADM

## 2018-09-22 RX ORDER — CALCITRIOL 0.25 UG/1
0.25 CAPSULE ORAL DAILY
Qty: 14 CAPSULE | Refills: 0 | Status: SHIPPED | OUTPATIENT
Start: 2018-09-22 | End: 2018-10-06

## 2018-09-22 RX ADMIN — CALCITRIOL 0.25 MCG: 0.25 CAPSULE ORAL at 08:09

## 2018-09-22 RX ADMIN — OXYCODONE HYDROCHLORIDE AND ACETAMINOPHEN 1 TABLET: 5; 325 TABLET ORAL at 06:09

## 2018-09-22 RX ADMIN — SODIUM CHLORIDE: 0.9 INJECTION, SOLUTION INTRAVENOUS at 01:09

## 2018-09-22 RX ADMIN — CALCIUM 2000 MG: 500 TABLET ORAL at 08:09

## 2018-09-22 RX ADMIN — OXYCODONE HYDROCHLORIDE AND ACETAMINOPHEN 1 TABLET: 10; 325 TABLET ORAL at 10:09

## 2018-09-22 RX ADMIN — OXYCODONE HYDROCHLORIDE AND ACETAMINOPHEN 1 TABLET: 10; 325 TABLET ORAL at 04:09

## 2018-09-22 NOTE — DISCHARGE SUMMARY
[facility]  DISCHARGE SUMMARY  General Surgery      Admit Date:  9/19/2018    Discharge Date and Time:  9/22/2018  12:00 PM    Attending Physician:  Devin Nicholas MD     Discharge Provider:  Kaiden Zabala MD     Reason for Admission:  Hyperparathyroidism     Procedures Performed:  Procedure(s):  PARATHYROIDECTOMY, SUBTOTAL    Hospital Course:  Please see the preoperative H&P and other available documentation for full details related to history prior to this admission.  Briefly, Archana Ward is a 63 y.o. female who was admitted following scheduled elective surgery for Hyperparathyroidism    She underwent the above stated procedure without complication.  She was transferred from the OR to PACU and then the floor.  She was kept as an inpatient for two additional days post op due to hypocalcemia and dialysis dependence.  Her labs and vitals remained stable and she is being discharged home after hemodialysis       Consults:  None.    Significant Diagnostic Studies:   Recent Labs   Lab  09/20/18   1242   WBC  9.07   HGB  10.7*   HCT  35.9*   PLT  102*     Recent Labs   Lab  09/20/18   1242   09/21/18   0432  09/21/18   1402  09/22/18   0446   NA  138   --    --    --    --    K  5.0   --    --    --    --    CL  99   --    --    --    --    CO2  23   --    --    --    --    BUN  76*   --    --    --    --    CREATININE  11.6*   --    --    --    --    GLU  83   --    --    --    --    CALCIUM  7.2*   --    --    --    --    CAION   --    < >  0.93*  0.96*  1.00*   PHOS  5.1*   --    --    --    --    ALBUMIN  3.2*   --    --    --    --     < > = values in this interval not displayed.   No results for input(s): INR, PTT, LABHEPA, LACTATE, TROPONINI, CPK, CPKMB, MB, BNP in the last 72 hours.No results for input(s): PH, PCO2, PO2, HCO3 in the last 72 hours.      Final Diagnoses:   Principal Problem:  Hyperparathyroidism   Secondary Diagnoses:    Active Hospital Problems    Diagnosis  POA     *Hyperparathyroidism [E21.3]  Yes    ESRD 2/2 HTN on HD since 5/22/13 [N18.6]  Yes     Chronic      Resolved Hospital Problems   No resolved problems to display.       Discharged Condition:  Good    Disposition:  Home or Self Care    Follow Up/Patient Instructions:     Medications:  Reconciled Home Medications:    Current Discharge Medication List      START taking these medications    Details   calcitRIOL (ROCALTROL) 0.25 MCG Cap Take 1 capsule (0.25 mcg total) by mouth once daily. for 14 days  Qty: 14 capsule, Refills: 0      calcium carbonate (OS-HUDSON) 500 mg calcium (1,250 mg) tablet Take 4 tablets (2,000 mg total) by mouth 3 (three) times daily. for 14 days  Qty: 45 tablet, Refills: 0      oxyCODONE-acetaminophen (PERCOCET) 5-325 mg per tablet Take 1 tablet by mouth every 6 (six) hours as needed.  Qty: 30 tablet, Refills: 0         CONTINUE these medications which have NOT CHANGED    Details   calcium acetate (PHOSLO) 667 mg capsule TAKE 4 CAPSULES BY MOUTH THREE TIMES A DAY WITH MEALS AND TAKE 4 CAPSULES BY MOUTH TWO TIMES A DAY WITH SNACKS  Refills: 6      SENSIPAR 60 mg Tab TAKE ONE TABLET BY MOUTH DAILY WITH EVENING MEAL OR AT BEDTIME   CHANGE IN DOSE  Refills: 6      aspirin 81 MG Chew Take 81 mg by mouth once daily.           Discharge Procedure Orders   Diet renal     No dressing needed     Notify your health care provider if you experience any of the following:  temperature >100.4     Notify your health care provider if you experience any of the following:  severe uncontrolled pain     Notify your health care provider if you experience any of the following:  redness, tenderness, or signs of infection (pain, swelling, redness, odor or green/yellow discharge around incision site)     Notify your health care provider if you experience any of the following:  difficulty breathing or increased cough     Change dressing (specify)   Order Comments: Can remove bandage when you get home and shower over the  incision     Activity as tolerated     Follow-up Information     Devin Nicholas MD In 2 weeks.    Specialty:  General Surgery  Contact information:  [facility] [facility] [id number] [phone]                   Kaiden Zabala MD

## 2018-09-22 NOTE — PLAN OF CARE
Problem: Fall Risk (Adult)  Intervention: Monitor/Assist with Self Care  Keep care area uncluttered and needed items within reach.

## 2018-09-22 NOTE — PROGRESS NOTES
I personally saw and examined the patient on hemodialysis, tolerating treatment, see hemodyalisis flowsheet. I also reviewed the chart and current medication. The dialysis bath was adjusted.   Net UF 2 liter, BP within normal range 110's to 130's systolic.   Patient doing well.

## 2018-09-22 NOTE — PLAN OF CARE
Problem: Patient Care Overview  Goal: Plan of Care Review  Outcome: Ongoing (interventions implemented as appropriate)  3 hr hd completed, net fluid mgvgwus=4282 mls, target goal achieved, pt tolerated well.  AVF deaccessed, pressure held to the site  8mins and 5  mins, A/V respectively, hemostasis achieved, bruit/thrill present post tx.

## 2018-09-22 NOTE — PROGRESS NOTES
Alert and oriented X4 with no complaints of pain. Gauze and transparent film to incisional neck dry and intact. Pt verbalizes understanding of discharge teaching with no questions or concerns.

## 2018-09-23 NOTE — OP NOTE
DATE OF PROCEDURE:  09/19/2018    PRIMARY SURGEON:  Devin Nicholas M.D.    ASSISTANT SURGEON:  Lyle Zabala M.D. (Surgery resident)    ANESTHESIA:  General endotracheal anesthesia.    PREOPERATIVE DIAGNOSIS:  Primary versus secondary hyperparathyroidism.    POSTOPERATIVE DIAGNOSIS:  Secondary hyperparathyroidism due to four-gland   hyperplasia due to history of end-stage renal disease.    PROCEDURE:  Subtotal parathyroidectomy (3.5 gland parathyroidectomy) and   incisional biopsy of left thyroid nodule intraoperatively.    COMPLICATIONS:  None.    ESTIMATED BLOOD LOSS:  Minimal.    DRAINS:  No drains were placed.    PROCEDURE IN DETAIL:  The patient underwent informed consent.  The history and   physical examination was reviewed and updated.  The patient's neck was marked in   the preop holding area.  She was brought to the Operating Room.  She underwent   a general endotracheal anesthesia.  Both arms were tucked at her side.  The head   and neck were extended with a transverse roll behind her shoulder blades.  An 8   cm transverse cervical collar incision was made within a natural skin crease   just above the sternal notch.  We made the incision 4 cm on either side of   midline.  We initiated the dissection on the right side.  We performed the   dissection by dissecting through the subcutaneous tissue and platysmal layers   with cautery.  Subplatysmal flaps were raised in the standard fashion between   the platysma and the anterior jugular veins.  The right-sided strap muscles were   reflected laterally.  The right thyroid lobe was rotated anteromedially keeping   the dissection on the extracapsular thyroidal plane.  We found a very large   left lower parathyroid gland, which was reddish brown and appeared hyperplastic   versus adenomatous.  We identified the recurrent laryngeal nerve and this large   gland was in the lower inferior position just slightly inferior, medial and   anterior to the course of the  nerve.  We then found another enlarged gland in   the superior position.  It was not quite as large as the lower gland, but still   appeared to be hyperplastic versus adenomatous with a reddish brown texture.    This was slightly superior, posterior and lateral to the course of the nerve.    Both of these glands were resected and sent to Pathology for frozen section,   which confirmed hypercellular parathyroid tissue.  We then extended our incision   to the left side.  The left-sided strap muscles were reflected laterally after   the platysma had been dissected and divided transversely and the subplatysmal   flaps had been created.  The self-retaining retractor was then placed.  A   left-sided strap muscles were reflected laterally.  The left thyroid lobe was   rotated anteromedially.  The left thyroid lobe had a firm hard nodule within it,   which had undergone previous fine needle aspiration, which had been benign.  It   was firm and hard, but it rotated anteromedially.  We again identified the left   lower gland in its standard position slightly anterior, medial and inferior to   the course of the nerve.  This again appeared to be enlarged, reddish brown and   consistent with hyperplastic versus cellular tissue.  We then identified the   upper gland, which was probably the smallest of all but still enlarged to over a   centimeter in size.  We removed the entire left lower gland and biopsied and   excised more than half of the left upper gland, leaving approximately a 9 x 7 x   5 mm remnant in the left upper superior position.  It was left viable and intact   to its blood supply.  It had good arterial bleeding.  Hemostasis was achieved   with cautery.  Frozen section again confirmed parathyroid tissue of the entirely   excised left lower as well as the partial removal of the left upper.    Certainly, the remnant that we left in place was between 1 to 2 times the size   of a normal parathyroid gland and it was  marked with a Prolene suture just   anterior, inferior and medial to the gland within the capsule of the thyroid   tissue in that area.  This was well above the insertion of the recurrent   laryngeal nerve and in close proximity to the superior pole vessels.  It was   also clipped with 3 Hemoclips.  We performed an incisional biopsy of the hard   calcified left thyroid nodule and this was submitted for frozen section.  The   calcified portion could not be frozen, but the soft tissue component was benign   on frozen section.  Hemostasis was achieved on the thyroid incisional biopsy   with cautery and this wedge incisional biopsy was reapproximated with a   figure-of-eight 3-0 Vicryl suture.  Both sides were irrigated.  The left upper   superior gland, which was left intact and marked as noted above was viable   without evidence of ischemia.  With hemostasis achieved, we placed surgical   fibrillar on both sides of the tracheoesophageal groove.  No drains were placed.    Estimated blood loss had been minimal.  The strap muscles were reapproximated   in the midline with interrupted 2-0 Vicryl sutures.  The platysma was closed   with a running 3-0 Vicryl suture and the skin closed with a running 4-0 Monocryl   subcuticular skin closure.  Mastisol, Steri-Strips, sterile Telfa gauze and   Tegaderm dressing were applied.  Estimated blood loss was minimal.  All needle,   instrument and sponge counts were correct.  The patient was turned over to   Anesthesia for extubation and transported to the recovery area in a satisfactory   condition.      ERIKA/IN  dd: 09/22/2018 18:02:22 (CDT)  td: 09/22/2018 19:23:00 (ABDIFATAH)  Doc ID   #[id number]  Job ID #[id number]    CC:

## 2018-09-25 ENCOUNTER — TELEPHONE (OUTPATIENT)
Dept: SURGERY | Facility: CLINIC | Age: 63
End: 2018-09-25

## 2018-09-25 NOTE — TELEPHONE ENCOUNTER
Spoke to the patient and let her know that she could take the bloody bandage off that had been on since her surgery. Also told her not to remove steri-strips. Patient verbalized understanding

## 2018-10-05 ENCOUNTER — OFFICE VISIT (OUTPATIENT)
Dept: SURGERY | Facility: CLINIC | Age: 63
End: 2018-10-05
Payer: MEDICARE

## 2018-10-05 ENCOUNTER — LAB VISIT (OUTPATIENT)
Dept: LAB | Facility: HOSPITAL | Age: 63
End: 2018-10-05
Attending: SURGERY
Payer: MEDICARE

## 2018-10-05 VITALS
HEART RATE: 66 BPM | SYSTOLIC BLOOD PRESSURE: 162 MMHG | WEIGHT: 145 LBS | TEMPERATURE: 99 F | BODY MASS INDEX: 26.68 KG/M2 | DIASTOLIC BLOOD PRESSURE: 81 MMHG | HEIGHT: 62 IN

## 2018-10-05 DIAGNOSIS — E21.3 HYPERPARATHYROIDISM: ICD-10-CM

## 2018-10-05 DIAGNOSIS — N25.81 SECONDARY HYPERPARATHYROIDISM, RENAL: ICD-10-CM

## 2018-10-05 DIAGNOSIS — N25.81 SECONDARY HYPERPARATHYROIDISM, RENAL: Primary | ICD-10-CM

## 2018-10-05 DIAGNOSIS — N25.81 SECONDARY HYPERPARATHYROIDISM OF RENAL ORIGIN: Chronic | ICD-10-CM

## 2018-10-05 LAB
ALBUMIN SERPL BCP-MCNC: 3.4 G/DL
ALP SERPL-CCNC: 383 U/L
ALT SERPL W/O P-5'-P-CCNC: 22 U/L
ANION GAP SERPL CALC-SCNC: 8 MMOL/L
AST SERPL-CCNC: 31 U/L
BILIRUB SERPL-MCNC: 0.5 MG/DL
BUN SERPL-MCNC: 33 MG/DL
CA-I BLDV-SCNC: 0.96 MMOL/L
CALCIUM SERPL-MCNC: 7.9 MG/DL
CHLORIDE SERPL-SCNC: 106 MMOL/L
CO2 SERPL-SCNC: 29 MMOL/L
CREAT SERPL-MCNC: 8.6 MG/DL
EST. GFR  (AFRICAN AMERICAN): 5 ML/MIN/1.73 M^2
EST. GFR  (NON AFRICAN AMERICAN): 4 ML/MIN/1.73 M^2
GLUCOSE SERPL-MCNC: 103 MG/DL
POTASSIUM SERPL-SCNC: 4.3 MMOL/L
PROT SERPL-MCNC: 7.2 G/DL
PTH-INTACT SERPL-MCNC: 393 PG/ML
SODIUM SERPL-SCNC: 143 MMOL/L

## 2018-10-05 PROCEDURE — 99024 POSTOP FOLLOW-UP VISIT: CPT | Mod: POP,,, | Performed by: SURGERY

## 2018-10-05 PROCEDURE — 99999 PR PBB SHADOW E&M-EST. PATIENT-LVL III: CPT | Mod: PBBFAC,,, | Performed by: SURGERY

## 2018-10-05 PROCEDURE — 82330 ASSAY OF CALCIUM: CPT

## 2018-10-05 PROCEDURE — 99213 OFFICE O/P EST LOW 20 MIN: CPT | Mod: PBBFAC,PO | Performed by: SURGERY

## 2018-10-05 PROCEDURE — 83970 ASSAY OF PARATHORMONE: CPT

## 2018-10-05 PROCEDURE — 36415 COLL VENOUS BLD VENIPUNCTURE: CPT

## 2018-10-05 PROCEDURE — 80053 COMPREHEN METABOLIC PANEL: CPT

## 2018-10-05 NOTE — MEDICAL/APP STUDENT
Subjective:       Patient ID: Archana Ward is a 63 y.o. female.    Chief Complaint: Post-op Evaluation (POst-Op Parathyroid 9/19)    Archana Ward is 63 y.o. female with past medical history of breast cancer, ESRD 2/2 to HTN, HTN, kidney transplant candidate, secondary parathyroidism, underwent parathyroidectomy on 9/19, presented for a 2 week post-op evaluation.  She complains of dysphagia 2/2 to pain, can tolerate some liquid but not solid food, could not swallow pills, had to swap calcium supplement to powder form, 2 pack 3x qd.  She also reports a new onset of dyspnea when she lay down sometimes, and had to jump back up to catch her breath.  She denies voice changes, swelling, tingling/numness of her fingers, muscle spasms, wheezing.  She denies fever, fatigue, weakness, or chill.  She takes oxycodone PRN for pain, average to 2x a day.       Review of Systems   Constitutional: Negative for chills, diaphoresis, fatigue and fever.   HENT: Negative for congestion, facial swelling, hearing loss, mouth sores, rhinorrhea, sneezing and voice change.    Eyes: Negative for visual disturbance.   Respiratory: Positive for apnea (sometimes at night when laying flat). Negative for chest tightness, shortness of breath, wheezing and stridor.    Cardiovascular: Negative for chest pain, palpitations and leg swelling.   Gastrointestinal: Negative for abdominal distention, abdominal pain, constipation, diarrhea, nausea and vomiting.   Genitourinary: Negative for frequency and urgency.   Musculoskeletal: Positive for neck pain (from the incision site, limitting her swollowing). Negative for neck stiffness.   Skin: Negative for pallor and rash.   Neurological: Negative for dizziness, weakness, numbness and headaches.       Past Medical History:   Diagnosis Date    Anemia of chronic renal failure 8/6/2014    Breast cancer     ESRD 2/2 HTN on HD since 5/22/13 8/6/2014    Hypertension - Dx in 30s 8/6/2014     Kidney transplant candidate 8/6/2014    Secondary hyperparathyroidism of renal origin 8/6/2014     Past Surgical History:   Procedure Laterality Date    AV FISTULA PLACEMENT  2013    BREAST BIOPSY Bilateral     years ago    CHOLECYSTECTOMY  1980s    COLONOSCOPY N/A 11/3/2014    [facility]    HYSTERECTOMY  1970's    LUMPECTOMY-BREAST with WIRE LOCALIZATION (TO BE INSERTED AT 8AM AT [facility]) LEFT BREAST  Left 11/4/2015    Performed by Rachel Saldana MD at [facility] OR [address] [place] [address]    LUMPECTOMY-BREAST-re-excision Left 12/16/2015    Performed by Rachel Saldana MD at [facility] OR [address] [place] [address]    PARATHYROIDECTOMY, SUBTOTAL  9/19/2018    Performed by Devin Nicholas MD at [facility] OR [address] [place] [address]    SUBTOTAL PARATHYROIDECTOMY  9/19/2018    Procedure: PARATHYROIDECTOMY, SUBTOTAL;  Surgeon: Devin Nicholas MD;  Location: [facility] OR [address] [place] [address];  Service: General;;      Current Outpatient Medications   Medication Sig Dispense Refill    aspirin 81 MG Chew Take 81 mg by mouth once daily.      calcitRIOL (ROCALTROL) 0.25 MCG Cap Take 1 capsule (0.25 mcg total) by mouth once daily. for 14 days 14 capsule 0    calcium acetate (PHOSLO) 667 mg capsule TAKE 4 CAPSULES BY MOUTH THREE TIMES A DAY WITH MEALS AND TAKE 4 CAPSULES BY MOUTH TWO TIMES A DAY WITH SNACKS  6    calcium carbonate (OS-HUDSON) 500 mg calcium (1,250 mg) tablet Take 4 tablets (2,000 mg total) by mouth 3 (three) times daily. for 14 days 45 tablet 0    oxyCODONE-acetaminophen (PERCOCET) 5-325 mg per tablet Take 1 tablet by mouth every 6 (six) hours as needed. 30 tablet 0    SENSIPAR 60 mg Tab TAKE ONE TABLET BY MOUTH DAILY WITH EVENING MEAL OR AT BEDTIME   CHANGE IN DOSE  6     No current facility-administered medications for this visit.       Social History     Socioeconomic History    Marital status: Legally      Spouse name: Not on file    Number of children: Not on file    Years of education: Not on file    Highest education level:  Not on file   Social Needs    Financial resource strain: Not on file    Food insecurity - worry: Not on file    Food insecurity - inability: Not on file    Transportation needs - medical: Not on file    Transportation needs - non-medical: Not on file   Occupational History     Employer: Disabled   Tobacco Use    Smoking status: Former Smoker     Packs/day: 0.50     Years: 10.00     Pack years: 5.00     Last attempt to quit: 2012     Years since quittin.1    Smokeless tobacco: Never Used   Substance and Sexual Activity    Alcohol use: No     Alcohol/week: 0.0 oz    Drug use: No    Sexual activity: No   Other Topics Concern    Not on file   Social History Narrative    Not on file       Objective:       Vitals:    10/05/18 1335   BP: (!) 162/81   Pulse: 66   Temp: 98.8 °F (37.1 °C)      Physical Exam   Neck:   Incision site is dry and clean, not erythematous, no discharges, no hematoma, no swelling.    Cardiovascular: Normal rate, regular rhythm and normal heart sounds. Exam reveals no friction rub.   No murmur heard.  Pulmonary/Chest: No stridor. No respiratory distress. She has no wheezes.   Abdominal: Soft. There is no tenderness.   Musculoskeletal: She exhibits no edema.       Assessment:       Archana Ward is a 63 y.o. is presented for 2 week post-op evaluation s/p parathyroidectomy.     Plan:       Repeat calcium lab, continue calcium supplementation.

## 2018-10-05 NOTE — LETTER
[facility]  [address]  [place] [address]  Phone: [phone]  Fax: [phone] October 6, 2018      Ashley Rodriguez MD  [address] [place] [address] Bl  [address]  [place] [address]    Patient: Archana Ward   MR Number: [id number]   YOB: 1955   Date of Visit: 10/5/2018     Dear Dr. Rodriguez:    Thank you for referring Archana Ward to me for evaluation. Attached you will find relevant portions of my assessment and plan of care.    Patient doing overall well post-op status post subtotal parathyroidectomy on 9-19-18 with small remnant of left upper superior PTH gland remaining in-situ.  She has been experiencing hungry bone syndrome with drop of iPTH level down to 150 range post-op with calcium down to 7.2 on post-op day # 1.    Paresthesias controlled with intermittent increase of oral CaCO3 and use of Rocaltrol.     Will check labs again today.  If Ca level within normal range will taper off CaCO3.  If Ca still low will continue with supplemental exogenous CaCO3 and Vitamin D as needed for hungry bone symptoms following subtotal parathyroidectomy for secondary hyperparathyroidism with presenting iPTH level of > 700.    Probably should not be on Sensipar if hypocalcemic post-op.  Patient will follow up with Nephrology team as well for surveillance.    If you have questions, please do not hesitate to call me. I look forward to following Archana Ward along with you.    Sincerely,    Devin Nicholas MD  Medical Director, [facility]ianna C[facility]  Staff Attending Surgeon - Department of Surgery  [facility]  Associate Professor of Surgery  [employer]  [employer]    RLC/hcr

## 2018-10-06 NOTE — PROGRESS NOTES
Patient ID: Archana Ward is a 63 y.o. female.     Chief Complaint: Post-op Evaluation (POst-Op Parathyroid 9/19)     Archana Ward is 63 y.o. female with past medical history of breast cancer, ESRD 2/2 to HTN, HTN, kidney transplant candidate, secondary parathyroidism, underwent parathyroidectomy on 9/19, presented for a 2 week post-op evaluation.  She complains of dysphagia 2/2 to pain, can tolerate some liquid but not solid food, could not swallow pills, had to swap calcium supplement to powder form, 2 pack 3x qd.  She also reports a new onset of dyspnea when she lay down sometimes, and had to jump back up to catch her breath.  She denies voice changes, swelling, tingling/numness of her fingers, muscle spasms, wheezing.  She denies fever, fatigue, weakness, or chill.  She takes oxycodone PRN for pain, average to 2x a day.         Review of Systems   Constitutional: Negative for chills, diaphoresis, fatigue and fever.   HENT: Negative for congestion, facial swelling, hearing loss, mouth sores, rhinorrhea, sneezing and voice change.    Eyes: Negative for visual disturbance.   Respiratory: Positive for apnea (sometimes at night when laying flat). Negative for chest tightness, shortness of breath, wheezing and stridor.    Cardiovascular: Negative for chest pain, palpitations and leg swelling.   Gastrointestinal: Negative for abdominal distention, abdominal pain, constipation, diarrhea, nausea and vomiting.   Genitourinary: Negative for frequency and urgency.   Musculoskeletal: Positive for neck pain (from the incision site, limitting her swollowing). Negative for neck stiffness.   Skin: Negative for pallor and rash.   Neurological: Negative for dizziness, weakness, numbness and headaches.            Past Medical History:   Diagnosis Date    Anemia of chronic renal failure 8/6/2014    Breast cancer      ESRD 2/2 HTN on HD since 5/22/13 8/6/2014    Hypertension - Dx in 30s 8/6/2014    Kidney  transplant candidate 8/6/2014    Secondary hyperparathyroidism of renal origin 8/6/2014            Past Surgical History:   Procedure Laterality Date    AV FISTULA PLACEMENT   2013    BREAST BIOPSY Bilateral       years ago    CHOLECYSTECTOMY   1980s    COLONOSCOPY N/A 11/3/2014     Per[facility]    HYSTERECTOMY   1970's    LUMPECTOMY-BREAST with WIRE LOCALIZATION (TO BE INSERTED AT 8AM AT [facility]) LEFT BREAST  Left 11/4/2015     Performed by Rachel Saldana MD at [facility] OR [address] [place] [address]    LUMPECTOMY-BREAST-re-excision Left 12/16/2015     Performed by Rachel Saldana MD at [facility] OR [address] [place] [address]    PARATHYROIDECTOMY, SUBTOTAL   9/19/2018     Performed by Devin Nicholas MD at [facility] OR [address] [place] [address]    SUBTOTAL PARATHYROIDECTOMY   9/19/2018     Procedure: PARATHYROIDECTOMY, SUBTOTAL;  Surgeon: Devin Nicholas MD;  Location: [facility] OR [address] [place] [address];  Service: General;;      Current Medications          Current Outpatient Medications   Medication Sig Dispense Refill    aspirin 81 MG Chew Take 81 mg by mouth once daily.        calcitRIOL (ROCALTROL) 0.25 MCG Cap Take 1 capsule (0.25 mcg total) by mouth once daily. for 14 days 14 capsule 0    calcium acetate (PHOSLO) 667 mg capsule TAKE 4 CAPSULES BY MOUTH THREE TIMES A DAY WITH MEALS AND TAKE 4 CAPSULES BY MOUTH TWO TIMES A DAY WITH SNACKS   6    calcium carbonate (OS-HUDSON) 500 mg calcium (1,250 mg) tablet Take 4 tablets (2,000 mg total) by mouth 3 (three) times daily. for 14 days 45 tablet 0    oxyCODONE-acetaminophen (PERCOCET) 5-325 mg per tablet Take 1 tablet by mouth every 6 (six) hours as needed. 30 tablet 0    SENSIPAR 60 mg Tab TAKE ONE TABLET BY MOUTH DAILY WITH EVENING MEAL OR AT BEDTIME   CHANGE IN DOSE   6      No current facility-administered medications for this visit.          Social History               Socioeconomic History    Marital status: Legally        Spouse name: Not on file    Number of children: Not on file     Years of education: Not on file    Highest education level: Not on file   Social Needs    Financial resource strain: Not on file    Food insecurity - worry: Not on file    Food insecurity - inability: Not on file    Transportation needs - medical: Not on file    Transportation needs - non-medical: Not on file   Occupational History       Employer: Disabled   Tobacco Use    Smoking status: Former Smoker       Packs/day: 0.50       Years: 10.00       Pack years: 5.00       Last attempt to quit: 2012       Years since quittin.1    Smokeless tobacco: Never Used   Substance and Sexual Activity    Alcohol use: No       Alcohol/week: 0.0 oz    Drug use: No    Sexual activity: No   Other Topics Concern    Not on file   Social History Narrative    Not on file            Objective:           Vitals:     10/05/18 1335   BP: (!) 162/81   Pulse: 66   Temp: 98.8 °F (37.1 °C)      Physical Exam   Neck:   Incision site is dry and clean, not erythematous, no discharges, no hematoma, no swelling.    Cardiovascular: Normal rate, regular rhythm and normal heart sounds. Exam reveals no friction rub.   No murmur heard.  Pulmonary/Chest: No stridor. No respiratory distress. She has no wheezes.   Abdominal: Soft. There is no tenderness.   Musculoskeletal: She exhibits no edema.       Assessment:       Archana Ward is a 63 y.o. is presented for 2 week post-op evaluation s/p parathyroidectomy.      Plan:       Repeat calcium lab, continue calcium supplementation.   I have personally taken the history and examined this patient and agree with the resident's note as stated above.    Pt doing overall well post-op s/p subtotal parathyroidectomy on 18 with small remnant of left upper superior PTH gland remaining in-situ.  Pt has been experiencing hungry bone syndrome with drop of iPTH level down to 150 range post-op with calcium down to 7.2 on POD # 1.  Paresthesias controlled with intermittent increase of oral  CaCO3 and use of Rocaltrol.    Will check labs again today.  If Ca level within normal range will taper off CaCO3.  If Ca still low will continue with supplemental exogenous CaCO3 and Vit D as needed for hungry bone symptoms following subtotal parathyroidectomy for secondary hyperparathyroidism with presenting iPTH level of > 700.  Probably should not be on Sensipar if hypocalcemic post-op.  Pt will follow up with Nephrology team as well for surveillance.

## 2018-11-14 ENCOUNTER — OFFICE VISIT (OUTPATIENT)
Dept: OBSTETRICS AND GYNECOLOGY | Facility: CLINIC | Age: 63
End: 2018-11-14
Payer: MEDICARE

## 2018-11-14 VITALS
HEIGHT: 62 IN | BODY MASS INDEX: 27.06 KG/M2 | WEIGHT: 147.06 LBS | DIASTOLIC BLOOD PRESSURE: 80 MMHG | SYSTOLIC BLOOD PRESSURE: 132 MMHG

## 2018-11-14 DIAGNOSIS — Z01.419 WOMEN'S ANNUAL ROUTINE GYNECOLOGICAL EXAMINATION: Primary | ICD-10-CM

## 2018-11-14 DIAGNOSIS — N89.8 VAGINAL ODOR: ICD-10-CM

## 2018-11-14 PROCEDURE — 99999 PR PBB SHADOW E&M-EST. PATIENT-LVL III: CPT | Mod: PBBFAC,,, | Performed by: NURSE PRACTITIONER

## 2018-11-14 PROCEDURE — G0101 CA SCREEN;PELVIC/BREAST EXAM: HCPCS | Mod: S$PBB,,, | Performed by: NURSE PRACTITIONER

## 2018-11-14 PROCEDURE — G0101 CA SCREEN;PELVIC/BREAST EXAM: HCPCS | Mod: PBBFAC | Performed by: NURSE PRACTITIONER

## 2018-11-14 PROCEDURE — 87660 TRICHOMONAS VAGIN DIR PROBE: CPT

## 2018-11-14 PROCEDURE — 99213 OFFICE O/P EST LOW 20 MIN: CPT | Mod: PBBFAC,25 | Performed by: NURSE PRACTITIONER

## 2018-11-14 RX ORDER — LOPERAMIDE HYDROCHLORIDE 2 MG/1
CAPSULE ORAL
Refills: 0 | COMMUNITY
Start: 2018-10-30

## 2018-11-14 RX ORDER — CYPROHEPTADINE HYDROCHLORIDE 4 MG/1
TABLET ORAL
Refills: 0 | COMMUNITY
Start: 2018-10-25

## 2018-11-14 RX ORDER — METRONIDAZOLE 500 MG/1
500 TABLET ORAL 2 TIMES DAILY
Qty: 14 TABLET | Refills: 0 | Status: SHIPPED | OUTPATIENT
Start: 2018-11-14 | End: 2019-01-02 | Stop reason: SDUPTHER

## 2018-11-14 NOTE — PROGRESS NOTES
CC: Annial  HPI: Pt is a 63 y.o.  female who presents for routine annual exam. She is s/p hysterectomy in her 40's d/t bleeding.  She does not want STD screening.  Pt is c/o vaginal DC and odor.  Denies any itching.  The patient participates in regular exercise: yes.  The patient does not smoke.  The patient wears seatbelts.   Pt denies any domestic violence.  Pt with h/o breast cancer diagnosed in 2016- s/p partial mastectomy and radiation.  Mammo is UTD- 7/2018 WNL.  She has ERSD on dialysis.  She is a kidney transplant candidate.      FH:  Breast cancer: none  Colon cancer: none  Ovarian cancer: none  Endometrial cancer: none      ROS:  GENERAL: Feeling well overall.   SKIN: Denies rash or lesions.   HEAD: Denies head injury or headache.   NODES: Denies enlarged lymph nodes.   CHEST: Denies chest pain or shortness of breath.   CARDIOVASCULAR: Denies palpitations or left sided chest pain.   ABDOMEN: No abdominal pain, nausea, vomiting or rectal bleeding.   URINARY: No dysuria or hematuria.  REPRODUCTIVE: See HPI.   BREASTS: Denies pain, lumps, or nipple discharge.   HEMATOLOGIC: No easy bruisability or excessive bleeding.   MUSCULOSKELETAL: Denies joint pain or swelling.   NEUROLOGIC: Denies syncope or weakness.   PSYCHIATRIC: Denies depression.    PE:    APPEARANCE: Well nourished, well developed, in no acute distress.  NODES: No inguinal lymph node enlargement.  ABDOMEN: Soft. No tenderness or masses. No hernias.  BREASTS: Symmetrical, no skin changes or visible lesions. No palpable masses, nipple discharge or adenopathy bilaterally.  PELVIC: Normal external female genitalia without lesions. Normal hair distribution. Adequate perineal body, normal urethral meatus. Vagina without lesions. + thin discharge. No significant cystocele or rectocele. Uterus and cervix surgically absent. Bimanual exam revealed no masses, tenderness or abnormality.  ANUS: Normal.    Diagnosis:  1. Women's annual routine gynecological  examination    2. Vaginal odor          PLAN:  mammo in 7/2019  Affirm  Flagyl  Vaginitis precautions    Orders Placed This Encounter    Vaginosis Screen by DNA Probe    metroNIDAZOLE (FLAGYL) 500 MG tablet       Patient was counseled today on postmenopausal issues.     Follow-up in 1 year.    Dionne Deshpande, COBYC

## 2018-11-14 NOTE — LETTER
November 14, 2018      Ashley Rodriguez MD  [address] [place] [address]  [address]  [place] [address]           [facility] [address]  [address] [facility] [address]  [facility] [address]  Phone: [phone]  Fax: [phone]          Patient: Archana Ward   MR Number: [id number]   YOB: 1955   Date of Visit: 11/14/2018       Dear Dr. Ashley Rodriguez:    Thank you for referring Archana Ward to me for evaluation. Attached you will find relevant portions of my assessment and plan of care.    If you have questions, please do not hesitate to call me. I look forward to following Archana Ward along with you.    Sincerely,    Dionne Deshpande, NP    Enclosure  CC:  No Recipients    If you would like to receive this communication electronically, please contact [email or web address] or [phone] to request more information on [facility] Link access.    For providers and/or their staff who would like to refer a patient to [facility], please contact us through our one-stop-shop provider referral line, [facility], at [phone].    If you feel you have received this communication in error or would no longer like to receive these types of communications, please e-mail [email or web address]

## 2018-11-15 ENCOUNTER — TELEPHONE (OUTPATIENT)
Dept: OBSTETRICS AND GYNECOLOGY | Facility: CLINIC | Age: 63
End: 2018-11-15

## 2018-11-16 NOTE — TELEPHONE ENCOUNTER
Called to let patient know her vaginosis culture came back positive for bacterial vaginosis- continue Keesha Amaya

## 2019-01-02 DIAGNOSIS — N89.8 VAGINAL ODOR: ICD-10-CM

## 2019-01-02 RX ORDER — METRONIDAZOLE 500 MG/1
TABLET ORAL
Qty: 14 TABLET | Refills: 0 | Status: SHIPPED | OUTPATIENT
Start: 2019-01-02 | End: 2019-04-24 | Stop reason: SDUPTHER

## 2019-04-24 DIAGNOSIS — N89.8 VAGINAL ODOR: ICD-10-CM

## 2019-04-24 RX ORDER — METRONIDAZOLE 500 MG/1
TABLET ORAL
Qty: 14 TABLET | Refills: 0 | Status: ON HOLD | OUTPATIENT
Start: 2019-04-24 | End: 2021-12-07 | Stop reason: HOSPADM

## 2019-05-13 NOTE — PROGRESS NOTES
SUBJECTIVE:   Laura Grewal is a 25 year old female who presents to clinic today for the following   health issues:multiple issues.   Recently moved to a new apartment, states she started to have more asthma exacerbations since she moved in October last year, things it might be related to old carpet.   Complains of anxiety, depression, states she also have concerns about possible ADD, she had mild ADHD when she was a child, she has concerns that her symptoms of anxiety and concentration might be due to ADD.   Still having migraines, states she developed more migraines when started Topamax.     Chief Complaint   Patient presents with     Anxiety     Letter Request     Patient has asthma, and when she moved into her apartment in october 2018, they didnt change or clean the carpet and they still wont, this is causing her and her kids to have asthma flare ups     Refill Request     Inhaler      Depression and Anxiety Follow-Up    Status since last visit: got better in the beginning now starting to go back to the same when she first started taking Prozac     Other associated symptoms:Has been more anxious- son has asthma, was coughing and vomiting due to his asthma     Complicating factors:     Significant life event: No     Current substance abuse: None    PHQ 3/23/2017 3/27/2019 5/13/2019   PHQ-9 Total Score 25 8 16   Q9: Thoughts of better off dead/self-harm past 2 weeks Several days Not at all Not at all     KERRY-7 SCORE 6/9/2016 3/10/2017 5/13/2019   Total Score - - -   Total Score 21 20 14     In the past two weeks have you had thoughts of suicide or self-harm?  No.    Do you have concerns about your personal safety or the safety of others?   No  PHQ-9  English  PHQ-9   Any Language  KERRY-7  Suicide Assessment Five-step Evaluation and Treatment (SAFE-T)    Amount of exercise or physical activity: None     Problems taking medications regularly: No    Medication side effects: none    Diet: regular (no  [facility]  General Surgery  Progress Note    Subjective:     History of Present Illness:  No notes on file    Post-Op Info:  Procedure(s):  PARATHYROIDECTOMY, SUBTOTAL   1 Day Post-Op     Interval History: NAEON.  Some pain with swallowing and talking.  HDS    Medications:  Continuous Infusions:  Scheduled Meds:   calcium carbonate  1,000 mg Oral Once    calcium carbonate  1,000 mg Oral TID     PRN Meds:ondansetron, oxyCODONE-acetaminophen, oxyCODONE-acetaminophen     Review of patient's allergies indicates:  No Known Allergies  Objective:     Vital Signs (Most Recent):  Temp: 98 °F (36.7 °C) (09/20/18 0555)  Pulse: 84 (09/20/18 0555)  Resp: 18 (09/20/18 0555)  BP: (!) 124/57 (09/20/18 0555)  SpO2: 100 % (09/20/18 0555) Vital Signs (24h Range):  Temp:  [97 °F (36.1 °C)-98.5 °F (36.9 °C)] 98 °F (36.7 °C)  Pulse:  [] 84  Resp:  [12-26] 18  SpO2:  [95 %-100 %] 100 %  BP: (124-187)/() 124/57     Weight: 65.8 kg (145 lb)  Body mass index is 26.52 kg/m².    Intake/Output - Last 3 Shifts       09/18 0700 - 09/19 0659 09/19 0700 - 09/20 0659 09/20 0700 - 09/21 0659    P.O.  420     I.V. (mL/kg)  250 (3.8)     Total Intake(mL/kg)  670 (10.2)     Net  +670            Urine Occurrence  1 x           Physical Exam   Constitutional: She is oriented to person, place, and time. She appears well-developed and well-nourished. No distress.   Neck:   Surgical incision c/d/i with dressing in place.  No signs of hematoma/seroma.  Soft.   Cardiovascular: Normal rate and intact distal pulses.   Pulmonary/Chest: Effort normal. No respiratory distress.   Abdominal: Soft. She exhibits no distension.   Neurological: She is alert and oriented to person, place, and time.   Hoarse voice        Significant Labs:    iCa  0.88    PTH  150    Assessment/Plan:     * Hyperparathyroidism    Renal diet  PRN pain meds  Increase Ca to 1g TID, gave an additional Ca dose this AM after iCa low  Appreciate nephrology  "restrictions)    Reviewed  and updated as needed this visit by clinical staff  Allergies  Meds         Reviewed and updated as needed this visit by Provider         BP Readings from Last 3 Encounters:   05/13/19 120/70   04/17/19 116/84   04/03/19 118/86    Wt Readings from Last 3 Encounters:   05/13/19 75 kg (165 lb 4.8 oz)   04/17/19 75 kg (165 lb 4.8 oz)   04/03/19 75.3 kg (166 lb)           Labs reviewed in EPIC    ROS:  Constitutional, HEENT, cardiovascular, pulmonary, gi and gu systems are negative, except as otherwise noted.    OBJECTIVE:     /70 (BP Location: Left arm, Patient Position: Sitting, Cuff Size: Adult Regular)   Pulse 81   Temp 98.9  F (37.2  C) (Tympanic)   Resp 18   Ht 1.607 m (5' 3.25\")   Wt 75 kg (165 lb 4.8 oz)   SpO2 99%   BMI 29.05 kg/m    Body mass index is 29.05 kg/m .  GENERAL: healthy, alert and no distress  RESP: lungs clear to auscultation - no rales, rhonchi or wheezes  NEURO: Normal strength and tone, mentation intact and speech normal  PSYCH: mentation appears normal, affect normal/bright    Diagnostic Test Results:  none     ASSESSMENT/PLAN:     1. Moderate episode of recurrent major depressive disorder (H)  -increased Prozac to 20 mg daily  -follow up in 6 weeks if still no improvement   - FLUoxetine 20 MG tablet; Take 1 tablet (20 mg) by mouth daily  Dispense: 30 tablet; Refill: 3    2. Intractable chronic migraine without aura and without status migrainosus  -stop Topamax  -start Gabapentin  -avoid daily use of NSAID's  -continue Maxalt as needed  -vitamin B 6 200 mg daily  - gabapentin (NEURONTIN) 300 MG capsule; Take 1 capsule (300 mg) by mouth At Bedtime  Dispense: 30 capsule; Refill: 3    3. History of ADHD  -possible ADD vs anxiety  -recommended evaluation   - MENTAL HEALTH REFERRAL  - Adult; Assessments and Testing; ADHD; [facility]: [facility] [phone]; We will contact you to schedule the appointment or please call with any questions    4. " assistance with Ca management  Will get HD today        ESRD 2/2 HTN on HD since 5/22/13    On [facility] HD  Nephrology consulted for HD today with high Ca bath             Kaiden Zabala MD  General Surgery  [facility]   Mild intermittent asthma without complication  - albuterol (PROAIR HFA/PROVENTIL HFA/VENTOLIN HFA) 108 (90 Base) MCG/ACT inhaler; Inhale 2 puffs into the lungs every 6 hours  Dispense: 8.5 g; Refill: 1    See Patient Instructions    CIELO Li [facility]

## 2019-06-03 DIAGNOSIS — N89.8 VAGINAL ODOR: ICD-10-CM

## 2019-06-03 RX ORDER — METRONIDAZOLE 500 MG/1
TABLET ORAL
Qty: 14 TABLET | Refills: 0 | Status: SHIPPED | OUTPATIENT
Start: 2019-06-03 | End: 2019-09-12

## 2019-09-12 ENCOUNTER — OFFICE VISIT (OUTPATIENT)
Dept: SURGERY | Facility: CLINIC | Age: 64
End: 2019-09-12
Payer: MEDICARE

## 2019-09-12 ENCOUNTER — HOSPITAL ENCOUNTER (OUTPATIENT)
Dept: RADIOLOGY | Facility: HOSPITAL | Age: 64
Discharge: HOME | End: 2019-09-12
Attending: SURGERY
Payer: MEDICARE

## 2019-09-12 VITALS
HEIGHT: 62 IN | DIASTOLIC BLOOD PRESSURE: 78 MMHG | HEART RATE: 84 BPM | BODY MASS INDEX: 27.06 KG/M2 | WEIGHT: 147.06 LBS | SYSTOLIC BLOOD PRESSURE: 167 MMHG

## 2019-09-12 VITALS — WEIGHT: 147 LBS | BODY MASS INDEX: 26.88 KG/M2

## 2019-09-12 DIAGNOSIS — Z85.3 PERSONAL HISTORY OF BREAST CANCER: Primary | ICD-10-CM

## 2019-09-12 DIAGNOSIS — Z12.31 ENCOUNTER FOR SCREENING MAMMOGRAM FOR BREAST CANCER: ICD-10-CM

## 2019-09-12 DIAGNOSIS — Z85.3 PERSONAL HISTORY OF BREAST CANCER: ICD-10-CM

## 2019-09-12 PROCEDURE — 77067 SCR MAMMO BI INCL CAD: CPT | Mod: 26,NTX,, | Performed by: RADIOLOGY

## 2019-09-12 PROCEDURE — 77067 SCR MAMMO BI INCL CAD: CPT | Mod: TC,TXP

## 2019-09-12 PROCEDURE — 99213 OFFICE O/P EST LOW 20 MIN: CPT | Mod: PBBFAC | Performed by: SURGERY

## 2019-09-12 PROCEDURE — 99999 PR PBB SHADOW E&M-EST. PATIENT-LVL III: CPT | Mod: PBBFAC,,, | Performed by: SURGERY

## 2019-09-12 PROCEDURE — 99213 PR OFFICE/OUTPT VISIT, EST, LEVL III, 20-29 MIN: ICD-10-PCS | Mod: S$PBB,,, | Performed by: SURGERY

## 2019-09-12 PROCEDURE — 77063 BREAST TOMOSYNTHESIS BI: CPT | Mod: 26,NTX,, | Performed by: RADIOLOGY

## 2019-09-12 PROCEDURE — 99999 PR PBB SHADOW E&M-EST. PATIENT-LVL III: ICD-10-PCS | Mod: PBBFAC,,, | Performed by: SURGERY

## 2019-09-12 PROCEDURE — 77063 MAMMO DIGITAL SCREENING BILAT WITH TOMOSYNTHESIS_CAD: ICD-10-PCS | Mod: 26,NTX,, | Performed by: RADIOLOGY

## 2019-09-12 PROCEDURE — 77067 MAMMO DIGITAL SCREENING BILAT WITH TOMOSYNTHESIS_CAD: ICD-10-PCS | Mod: 26,NTX,, | Performed by: RADIOLOGY

## 2019-09-12 PROCEDURE — 99213 OFFICE O/P EST LOW 20 MIN: CPT | Mod: S$PBB,,, | Performed by: SURGERY

## 2019-09-12 RX ORDER — METOPROLOL SUCCINATE 25 MG/1
25 TABLET, EXTENDED RELEASE ORAL
Status: ON HOLD | COMMUNITY
Start: 2019-08-02 | End: 2021-12-07 | Stop reason: HOSPADM

## 2019-09-12 RX ORDER — SEVELAMER CARBONATE 800 MG/1
2400 TABLET, FILM COATED ORAL
Status: ON HOLD | COMMUNITY
End: 2021-12-07 | Stop reason: HOSPADM

## 2019-09-12 RX ORDER — PANTOPRAZOLE SODIUM 40 MG/1
40 TABLET, DELAYED RELEASE ORAL
COMMUNITY

## 2019-09-12 RX ORDER — DICLOFENAC SODIUM 10 MG/G
GEL TOPICAL
COMMUNITY
Start: 2019-08-02

## 2019-09-16 ENCOUNTER — SOCIAL WORK (OUTPATIENT)
Dept: TRANSPLANT | Facility: CLINIC | Age: 64
End: 2019-09-16

## 2019-09-24 ENCOUNTER — TELEPHONE (OUTPATIENT)
Dept: SURGERY | Facility: CLINIC | Age: 64
End: 2019-09-24

## 2019-09-24 ENCOUNTER — TELEPHONE (OUTPATIENT)
Dept: TRANSPLANT | Facility: CLINIC | Age: 64
End: 2019-09-24

## 2019-09-24 DIAGNOSIS — D05.12 DUCTAL CARCINOMA IN SITU (DCIS) OF LEFT BREAST: Primary | ICD-10-CM

## 2019-09-24 DIAGNOSIS — Z85.3 PERSONAL HISTORY OF BREAST CANCER: ICD-10-CM

## 2019-09-24 NOTE — TELEPHONE ENCOUNTER
Called Patient to schedule an appointment with Dr. Zavala per Dr. Saldana's request.  Patient stated that she has breast asymmetry.  Address verified.  Appointment made for 10-23-19 at 10:15.  Appointment slip mailed.

## 2019-10-07 ENCOUNTER — TELEPHONE (OUTPATIENT)
Dept: TRANSPLANT | Facility: CLINIC | Age: 64
End: 2019-10-07

## 2019-10-07 NOTE — TELEPHONE ENCOUNTER
DARRELL faxed compliance form again and attempted to contact CECY RAMÍREZ to follow up on pt's history of transportation issues, non-compliance and lack of caregivers. DARRELL was not able to leave a message and DARRELL will attempted again. DARRELL remains available.

## 2019-10-09 ENCOUNTER — TELEPHONE (OUTPATIENT)
Dept: TRANSPLANT | Facility: CLINIC | Age: 64
End: 2019-10-09

## 2019-10-09 NOTE — TELEPHONE ENCOUNTER
DARRELL received compliance check. DARRELL attempted to follow up with Aleida(charge nurse) who sent the referral and has reached out to speak with a SW. Aleida was out today and DARRELL left a message with her to follow up. SW awaits call back.     In the last 3 months, pt has had 0 AMAs   5 no shows:  9/17/19-sick  6/22/2019- no transportation  6/1/2019-no transportation  4/3/2019- no transportation  3/30/2019- no transportation    SW is concerned about the lack of transportation. Pt has a history of issues with transportation and has been removed from the waitlist because she was unable to follow up with appointments. Nurse listed there are concerns with labs : Ca 7.7 and PTH was 736 on 9/19/19. No concerns with caregivers or psychosocial issues listed. DARRELL will follow up with Aleida for further input. Compliance sheet placed in to be uploaded basket. DARRELL remains available.

## 2019-10-10 ENCOUNTER — TELEPHONE (OUTPATIENT)
Dept: TRANSPLANT | Facility: CLINIC | Age: 64
End: 2019-10-10

## 2019-10-10 NOTE — TELEPHONE ENCOUNTER
"SW received call from clinic manager Aleida. DARRELL discussed concerns for transportation and caregivers with Aleida. She reports that pt's transportation issues have not resolved and that her caregiver situation is the same. Aleida reports that pt's daughter is still her only reliable person in her life and that her daughter works. Aleida reports pt relies on transportation to bring her to dialysis and that pt's daughter doesn't.    Aleida reports "She keeps asking me about her referral so I just thought i'd follow up. She is a very compliant person when she can get here, but her situation hasn't changed".     DARRELL consulted with the entire kidney transplant social work team. At this time, patient is unacceptable from a psychosocial standpoint. Previously, patient was de-listed for lack of caregivers and issues with transportation. Pt has a history of non compliance with appointments due to issues with transportation. Patient situation has not changed and is considered high risk for transplant.     SW remains available. DARRELL has sent an in basket message to referral coordinator.     ----- Message from Destiny Moore LMSW sent at 9/26/2019  8:39 AM CDT -----  Regarding: moved from in basket  Abelardo Walsh RN  P [facility] Kidney Transplant Social Workers  Caller: Aleida wills/ Arnold         Please see the message below. This was a hello lady sent for  compliance check.       Previous Messages        ----- Message -----   From: Tony Romero   Sent: 9/26/2019   8:26 AM CDT   To: [facility] Pre-Kidney Transplant Clinical     Calling to check pt's referral status     Contact: [phone](Ask for Aleida)     Best time to call before 11am           "

## 2020-01-29 ENCOUNTER — OFFICE VISIT (OUTPATIENT)
Dept: PLASTIC SURGERY | Facility: CLINIC | Age: 65
End: 2020-01-29
Payer: MEDICARE

## 2020-01-29 VITALS
WEIGHT: 150.38 LBS | SYSTOLIC BLOOD PRESSURE: 152 MMHG | HEART RATE: 78 BPM | BODY MASS INDEX: 27.5 KG/M2 | DIASTOLIC BLOOD PRESSURE: 71 MMHG

## 2020-01-29 DIAGNOSIS — D05.12 DUCTAL CARCINOMA IN SITU (DCIS) OF LEFT BREAST: Primary | ICD-10-CM

## 2020-01-29 PROCEDURE — 99212 OFFICE O/P EST SF 10 MIN: CPT | Mod: PBBFAC | Performed by: SURGERY

## 2020-01-29 PROCEDURE — 99999 PR PBB SHADOW E&M-EST. PATIENT-LVL II: ICD-10-PCS | Mod: PBBFAC,,, | Performed by: SURGERY

## 2020-01-29 PROCEDURE — 99999 PR PBB SHADOW E&M-EST. PATIENT-LVL II: CPT | Mod: PBBFAC,,, | Performed by: SURGERY

## 2020-01-29 PROCEDURE — 99203 OFFICE O/P NEW LOW 30 MIN: CPT | Mod: S$PBB,,, | Performed by: SURGERY

## 2020-01-29 PROCEDURE — 99203 PR OFFICE/OUTPT VISIT, NEW, LEVL III, 30-44 MIN: ICD-10-PCS | Mod: S$PBB,,, | Performed by: SURGERY

## 2020-01-29 NOTE — PROGRESS NOTES
History & Physical    SUBJECTIVE:   Chief complaint: breast assymetry    History of Present Illness:  64 y.o. 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  Presents today for evaluation for symmetry procedure.     Denies any significant changes in her health over the last 2 years.   Dialysis on tues, thurs, sat.     Past Medical History:   Diagnosis Date    Anemia of chronic renal failure 2014    Breast cancer     ESRD 2/2 HTN on HD since 11    Hypertension - Dx in 30s 2014    Kidney transplant candidate 2014    Secondary hyperparathyroidism of renal origin 2014       Past Surgical History:   Procedure Laterality Date    AV FISTULA PLACEMENT      BREAST BIOPSY Bilateral     years ago    CHOLECYSTECTOMY      HYSTERECTOMY      SUBTOTAL PARATHYROIDECTOMY  2018    Procedure: PARATHYROIDECTOMY, SUBTOTAL;  Surgeon: Devin Nicholas MD;  Location: [facility] OR [address] [place] [address];  Service: General;;       Family History   Problem Relation Age of Onset    Heart disease Father     Breast cancer Neg Hx     Cancer Neg Hx     Colon cancer Neg Hx     Ovarian cancer Neg Hx        Social History     Socioeconomic History    Marital status: Legally      Spouse name: Not on file    Number of children: Not on file    Years of education: Not on file    Highest education level: Not on file   Occupational History     Employer: Disabled   Social Needs    Financial resource strain: Not on file    Food insecurity:     Worry: Not on file     Inability: Not on file    Transportation needs:     Medical: Not on file     Non-medical: Not on file   Tobacco Use    Smoking status: Former Smoker     Packs/day: 0.50     Years: 10.00     Pack years: 5.00     Last attempt to quit: 2012     Years since quittin.4    Smokeless tobacco: Never Used   Substance and Sexual Activity    Alcohol use: No     Alcohol/week: 0.0 standard drinks     Drug use: No    Sexual activity: Never   Lifestyle    Physical activity:     Days per week: Not on file     Minutes per session: Not on file    Stress: Not on file   Relationships    Social connections:     Talks on phone: Not on file     Gets together: Not on file     Attends Restorationist service: Not on file     Active member of club or organization: Not on file     Attends meetings of clubs or organizations: Not on file     Relationship status: Not on file   Other Topics Concern    Not on file   Social History Narrative    Not on file       Current Outpatient Medications   Medication Sig Dispense Refill    aspirin 81 MG Chew Take 81 mg by mouth once daily.      calcium acetate (PHOSLO) 667 mg capsule TAKE 4 CAPSULES BY MOUTH THREE TIMES A DAY WITH MEALS AND TAKE 4 CAPSULES BY MOUTH TWO TIMES A DAY WITH SNACKS  6    calcium carbonate (OS-HUDSON) 500 mg calcium (1,250 mg) tablet Take 4 tablets (2,000 mg total) by mouth 3 (three) times daily. for 14 days 45 tablet 0    cyproheptadine (PERIACTIN) 4 mg tablet TAKE 1 TABLET (4 MG TOTAL) BY MOUTH 2 (TWO) TIMES DAILY AS NEEDED (FOR INCREASING APPETITE)  0    diclofenac sodium (VOLTAREN) 1 % Gel Apply 4g to painful areas TID PRN for pain      loperamide (IMODIUM) 2 mg capsule TAKE 1 CAPSULE (2 MG TOTAL) BY MOUTH 4 (FOUR) TIMES DAILY AS NEEDED FOR DIARRHEA FOR UP TO 7 DAYS  0    metoprolol succinate (TOPROL XL) 25 MG 24 hr tablet Take 25 mg by mouth.      metroNIDAZOLE (FLAGYL) 500 MG tablet TAKE 1 TABLET BY MOUTH TWICE DAILY FOR 7 DAYS 14 tablet 0    pantoprazole (PROTONIX) 40 MG tablet Take 40 mg by mouth.      sevelamer carbonate (RENVELA) 800 mg Tab Take 2,400 mg by mouth.       No current facility-administered medications for this visit.        Review of patient's allergies indicates:  No Known Allergies      Review of Systems:  Review of systems negative except as pertinent positive and negatives  listed above      OBJECTIVE:     BP (!) 152/71   Pulse 78    Wt 68.2 kg (150 lb 5.7 oz)   LMP  (LMP Unknown)   BMI 27.50 kg/m²       Physical Exam:  Gen: NAD, Aox3  Neuro: no focal deficits  HEENT: NCAT, neck supple  Left breast with mild radiation changes, all incisions well healed. Grade 2 ptosis  Right breast grade 3 ptosis larger than left breast.           ASSESSMENT/PLAN:     65 yo female as above.  Will plan for right breast reduction for symmetry.      Discussed at length the need for medical clearance as well as coordination and clearance with nephrology/dialysis.          TOI Castellanos MD, FACS  Plastic Surgery Fellow

## 2020-07-09 ENCOUNTER — HOSPITAL ENCOUNTER (EMERGENCY)
Facility: HOSPITAL | Age: 65
Discharge: HOME | End: 2020-07-09
Attending: INTERNAL MEDICINE
Payer: MEDICARE

## 2020-07-09 VITALS
HEIGHT: 62 IN | RESPIRATION RATE: 18 BRPM | HEART RATE: 89 BPM | OXYGEN SATURATION: 99 % | TEMPERATURE: 98 F | DIASTOLIC BLOOD PRESSURE: 74 MMHG | BODY MASS INDEX: 27.05 KG/M2 | SYSTOLIC BLOOD PRESSURE: 137 MMHG | WEIGHT: 147 LBS

## 2020-07-09 DIAGNOSIS — T78.40XA ALLERGIC REACTION, INITIAL ENCOUNTER: Primary | ICD-10-CM

## 2020-07-09 PROCEDURE — 63600175 PHARM REV CODE 636 W HCPCS: Mod: ER | Performed by: INTERNAL MEDICINE

## 2020-07-09 PROCEDURE — 99283 EMERGENCY DEPT VISIT LOW MDM: CPT | Mod: ER

## 2020-07-09 PROCEDURE — 25000003 PHARM REV CODE 250: Mod: ER | Performed by: INTERNAL MEDICINE

## 2020-07-09 RX ORDER — PREDNISONE 20 MG/1
40 TABLET ORAL
Status: COMPLETED | OUTPATIENT
Start: 2020-07-09 | End: 2020-07-09

## 2020-07-09 RX ORDER — HYDROXYZINE PAMOATE 25 MG/1
25 CAPSULE ORAL
Status: COMPLETED | OUTPATIENT
Start: 2020-07-09 | End: 2020-07-09

## 2020-07-09 RX ORDER — HYDROXYZINE HYDROCHLORIDE 25 MG/1
25 TABLET, FILM COATED ORAL 3 TIMES DAILY PRN
Qty: 30 TABLET | Refills: 0 | Status: SHIPPED | OUTPATIENT
Start: 2020-07-09

## 2020-07-09 RX ADMIN — HYDROXYZINE PAMOATE 25 MG: 25 CAPSULE ORAL at 03:07

## 2020-07-09 RX ADMIN — PREDNISONE 40 MG: 20 TABLET ORAL at 03:07

## 2020-07-09 NOTE — ED PROVIDER NOTES
Encounter Date: 2020       History     Chief Complaint   Patient presents with    Rash     reports noticing rash to entire body today. reports itching starting 2 days ago. denies allergies. reports changing detergent on monday and has never used that kind before     65-year-old female presents to the emergency department complaining itching all over x2 days.  She states she recently changed detergents and believes the change may have triggered her itchy rash.  She denies fever/chills/nausea/vomiting/shortness of breath/chest pain.    The history is provided by the patient. No  was used.     Review of patient's allergies indicates:  No Known Allergies  Past Medical History:   Diagnosis Date    Anemia of chronic renal failure 2014    Breast cancer     ESRD 2/2 HTN on HD since 11    Hypertension - Dx in 30s 2014    Kidney transplant candidate 2014    Secondary hyperparathyroidism of renal origin 2014     Past Surgical History:   Procedure Laterality Date    AV FISTULA PLACEMENT      BREAST BIOPSY Bilateral     years ago    CHOLECYSTECTOMY      HYSTERECTOMY  's    SUBTOTAL PARATHYROIDECTOMY  2018    Procedure: PARATHYROIDECTOMY, SUBTOTAL;  Surgeon: Devin Nicholas MD;  Location: [facility] OR [address] [place] [address];  Service: General;;     Family History   Problem Relation Age of Onset    Heart disease Father     Breast cancer Neg Hx     Cancer Neg Hx     Colon cancer Neg Hx     Ovarian cancer Neg Hx      Social History     Tobacco Use    Smoking status: Former Smoker     Packs/day: 0.50     Years: 10.00     Pack years: 5.00     Quit date: 2012     Years since quittin.9    Smokeless tobacco: Never Used   Substance Use Topics    Alcohol use: No     Alcohol/week: 0.0 standard drinks    Drug use: No     Review of Systems   Constitutional: Negative for chills and fever.   Skin: Positive for rash.   All other systems reviewed and are  negative.      Physical Exam     Initial Vitals [07/09/20 0304]   BP Pulse Resp Temp SpO2   137/74 89 18 97.9 °F (36.6 °C) 99 %      MAP       --         Physical Exam    Nursing note and vitals reviewed.  Constitutional: She appears well-developed and well-nourished.   HENT:   Head: Normocephalic and atraumatic.   Eyes: Conjunctivae and EOM are normal.   Neck: Neck supple.   Cardiovascular: Normal rate and regular rhythm.   Pulmonary/Chest: Breath sounds normal. No respiratory distress.   Abdominal: Soft. Bowel sounds are normal.   Musculoskeletal: Normal range of motion.   Neurological: She is alert.   Skin: Skin is warm and dry.   Diffuse, erythematous, maculopapular rash   Psychiatric: She has a normal mood and affect. Thought content normal.         ED Course   Procedures  Labs Reviewed - No data to display       Imaging Results    None          Medical Decision Making:   Initial Assessment:   65-year-old female presents to the emergency department complaining itching all over x2 days.  She states she recently changed detergents and believes the change may have triggered her itchy rash.  She denies fever/chills/nausea/vomiting/shortness of breath/chest pain.  ED Management:  Patient was given instructions for allergic dermatitis and received prednisone/hydroxyzine in the emergency department.  A prescription for hydroxyzine was given and the patient was advised to follow up with her primary care physician within the next 4 days for re-evaluation/return to the emergency department if condition worsens.                                 Clinical Impression:       ICD-10-CM ICD-9-CM   1. Allergic reaction, initial encounter  T78.40XA 995.3             ED Disposition Condition    Discharge Stable        ED Prescriptions     Medication Sig Dispense Start Date End Date Auth. Provider    hydrOXYzine HCL (ATARAX) 25 MG tablet Take 1 tablet (25 mg total) by mouth 3 (three) times daily as needed for Itching. 30 tablet  7/9/2020  Cash Garrido MD        Follow-up Information     Follow up With Specialties Details Why Contact Info    Ashley Rodriguez MD General Practice Schedule an appointment as soon as possible for a visit in 1 week For reevaluation [address] [place] [address]  [id number]  [place] [address]  [phone]                                       Cash Garrido MD  07/09/20 0504

## 2020-09-04 ENCOUNTER — TELEPHONE (OUTPATIENT)
Dept: SURGERY | Facility: CLINIC | Age: 65
End: 2020-09-04

## 2020-09-04 DIAGNOSIS — Z85.3 PERSONAL HISTORY OF BREAST CANCER: Primary | ICD-10-CM

## 2020-09-04 DIAGNOSIS — Z12.31 SCREENING MAMMOGRAM, ENCOUNTER FOR: ICD-10-CM

## 2020-09-04 NOTE — TELEPHONE ENCOUNTER
As requested by Karolyn Kramer nurse. Patient has been scheduled for her 1yr  Follow up CBE & Breast Imaging on 9/17/20 apt slip has been mailed out

## 2020-09-17 ENCOUNTER — HOSPITAL ENCOUNTER (OUTPATIENT)
Dept: RADIOLOGY | Facility: HOSPITAL | Age: 65
Discharge: HOME | End: 2020-09-17
Attending: SURGERY
Payer: MEDICARE

## 2020-09-17 ENCOUNTER — OFFICE VISIT (OUTPATIENT)
Dept: SURGERY | Facility: CLINIC | Age: 65
End: 2020-09-17
Payer: MEDICARE

## 2020-09-17 VITALS
HEIGHT: 62 IN | BODY MASS INDEX: 28.37 KG/M2 | WEIGHT: 154.19 LBS | SYSTOLIC BLOOD PRESSURE: 142 MMHG | HEART RATE: 77 BPM | DIASTOLIC BLOOD PRESSURE: 66 MMHG

## 2020-09-17 DIAGNOSIS — Z12.31 SCREENING MAMMOGRAM, ENCOUNTER FOR: ICD-10-CM

## 2020-09-17 DIAGNOSIS — Z12.39 BREAST CANCER SCREENING: ICD-10-CM

## 2020-09-17 DIAGNOSIS — Z85.3 HISTORY OF BREAST CANCER: Primary | ICD-10-CM

## 2020-09-17 DIAGNOSIS — Z85.3 PERSONAL HISTORY OF BREAST CANCER: ICD-10-CM

## 2020-09-17 PROCEDURE — 77067 SCR MAMMO BI INCL CAD: CPT | Mod: 26,,, | Performed by: RADIOLOGY

## 2020-09-17 PROCEDURE — 77063 BREAST TOMOSYNTHESIS BI: CPT | Mod: 26,,, | Performed by: RADIOLOGY

## 2020-09-17 PROCEDURE — 77063 MAMMO DIGITAL SCREENING BILAT WITH TOMOSYNTHESIS_CAD: ICD-10-PCS | Mod: 26,,, | Performed by: RADIOLOGY

## 2020-09-17 PROCEDURE — 77067 MAMMO DIGITAL SCREENING BILAT WITH TOMOSYNTHESIS_CAD: ICD-10-PCS | Mod: 26,,, | Performed by: RADIOLOGY

## 2020-09-17 PROCEDURE — 99213 OFFICE O/P EST LOW 20 MIN: CPT | Mod: S$PBB,,, | Performed by: SURGERY

## 2020-09-17 PROCEDURE — 77067 SCR MAMMO BI INCL CAD: CPT | Mod: TC

## 2020-09-17 PROCEDURE — 99213 OFFICE O/P EST LOW 20 MIN: CPT | Mod: PBBFAC | Performed by: SURGERY

## 2020-09-17 PROCEDURE — 99999 PR PBB SHADOW E&M-EST. PATIENT-LVL III: ICD-10-PCS | Mod: PBBFAC,,, | Performed by: SURGERY

## 2020-09-17 PROCEDURE — 99999 PR PBB SHADOW E&M-EST. PATIENT-LVL III: CPT | Mod: PBBFAC,,, | Performed by: SURGERY

## 2020-09-17 PROCEDURE — 99213 PR OFFICE/OUTPT VISIT, EST, LEVL III, 20-29 MIN: ICD-10-PCS | Mod: S$PBB,,, | Performed by: SURGERY

## 2020-09-17 NOTE — PROGRESS NOTES
[facility]  [facility]  9/17/2020      SUBJECTIVE:   Ms. Archana Ward is a 65 y.o. female with a history of LEFT breast cancer who presents today for follow up breast cancer screening.      DIAGNOSIS:   Patient has a history of stage 0 Tis (3 cm) NxMx, grade 3, ER -, [place] - DCIS of the left breast.     TREATMENT:   1. Left partial mastectomy without sentinel node biopsy on 11/4/2015. Rachel Saldana M.D. Surgical Oncology  2. reexcision left breast 12/16/2015. Rachel Saldana, Surgical Oncology.  3. Radiation therapy from 2/2016  To 3/2016. Lucy Arevalo M.D. Radiation Oncology      Interval History:  Patient states she is doing well. She denies any unexplained weight loss, changes in vision, or recent headaches.  She denies any bone pain.  She denies palpating any masses at her breasts.      Her last mammogram was on 9/12/19 and read as BIRADS 2, benign. She had another mammogram today; results pending.     Review of Systems: Denies any chest pain or shortness of breath.  Denies any fever or chills.  See HPI/ Interval History for other systems reviewed.    OBJECTIVE:   Vitals:    09/17/20 1426   BP: (!) 142/66   Pulse: 77      Physical Exam:  HEENT: Normocephalic, atraumatic.    General: alert and oriented; no acute distress.  Breast/Chest: No masses present bilaterally.  No erythema or edema.    Lymph: No palpable adjacent axillary lymph nodes.     ASSESSMENT:  Ms. Archana Ward is a 65 y.o. year old female with a history of LEFT breast cancer who presents today for follow up breast cancer screening.      PLAN:   We discussed that there was nothing concerning on clinical breast exam today. She can follow up with us as needed or in one year at the time of her bilateral screening mammogram.     ~Suki Gaines PA-C      Surgical Oncology            9/17/2020

## 2020-09-21 ENCOUNTER — TELEPHONE (OUTPATIENT)
Dept: RADIOLOGY | Facility: HOSPITAL | Age: 65
End: 2020-09-21

## 2020-09-21 NOTE — TELEPHONE ENCOUNTER
Spoke with patient and explained mammogram findings.Patient expressed understanding of results. Patient scheduled abnormal mammogram follow up appointment at [facility] [place] on 9/29/2020.

## 2020-09-29 ENCOUNTER — HOSPITAL ENCOUNTER (OUTPATIENT)
Dept: RADIOLOGY | Facility: HOSPITAL | Age: 65
Discharge: HOME | End: 2020-09-29
Attending: SURGERY
Payer: MEDICARE

## 2020-09-29 DIAGNOSIS — R92.8 ABNORMAL MAMMOGRAM: ICD-10-CM

## 2020-09-29 PROCEDURE — 77061 BREAST TOMOSYNTHESIS UNI: CPT | Mod: TC,LT

## 2020-09-29 PROCEDURE — 77065 MAMMO DIGITAL DIAGNOSTIC LEFT WITH TOMO: ICD-10-PCS | Mod: 26,LT,, | Performed by: RADIOLOGY

## 2020-09-29 PROCEDURE — 77061 MAMMO DIGITAL DIAGNOSTIC LEFT WITH TOMO: ICD-10-PCS | Mod: 26,LT,, | Performed by: RADIOLOGY

## 2020-09-29 PROCEDURE — 77065 DX MAMMO INCL CAD UNI: CPT | Mod: TC,LT

## 2020-09-29 PROCEDURE — 77065 DX MAMMO INCL CAD UNI: CPT | Mod: 26,LT,, | Performed by: RADIOLOGY

## 2020-09-29 PROCEDURE — 77061 BREAST TOMOSYNTHESIS UNI: CPT | Mod: 26,LT,, | Performed by: RADIOLOGY

## 2020-10-16 DIAGNOSIS — Z85.3 HISTORY OF BREAST CANCER: Primary | ICD-10-CM

## 2021-10-14 ENCOUNTER — TELEPHONE (OUTPATIENT)
Dept: SURGERY | Facility: CLINIC | Age: 66
End: 2021-10-14

## 2021-10-18 ENCOUNTER — TELEPHONE (OUTPATIENT)
Dept: SURGERY | Facility: CLINIC | Age: 66
End: 2021-10-18

## 2021-10-20 ENCOUNTER — OFFICE VISIT (OUTPATIENT)
Dept: SURGERY | Facility: CLINIC | Age: 66
End: 2021-10-20
Payer: MEDICARE

## 2021-10-20 VITALS
HEIGHT: 62 IN | DIASTOLIC BLOOD PRESSURE: 76 MMHG | WEIGHT: 152 LBS | HEART RATE: 75 BPM | BODY MASS INDEX: 27.97 KG/M2 | SYSTOLIC BLOOD PRESSURE: 163 MMHG

## 2021-10-20 DIAGNOSIS — Z98.890 S/P LUMPECTOMY, LEFT BREAST: ICD-10-CM

## 2021-10-20 DIAGNOSIS — Z85.3 PERSONAL HISTORY OF BREAST CANCER: Primary | ICD-10-CM

## 2021-10-20 DIAGNOSIS — Z12.31 SCREENING MAMMOGRAM, ENCOUNTER FOR: ICD-10-CM

## 2021-10-20 PROCEDURE — 1101F PT FALLS ASSESS-DOCD LE1/YR: CPT | Mod: CPTII,S$GLB,, | Performed by: PHYSICIAN ASSISTANT

## 2021-10-20 PROCEDURE — 1160F RVW MEDS BY RX/DR IN RCRD: CPT | Mod: CPTII,S$GLB,, | Performed by: PHYSICIAN ASSISTANT

## 2021-10-20 PROCEDURE — 1157F ADVNC CARE PLAN IN RCRD: CPT | Mod: CPTII,S$GLB,, | Performed by: PHYSICIAN ASSISTANT

## 2021-10-20 PROCEDURE — 3078F DIAST BP <80 MM HG: CPT | Mod: CPTII,S$GLB,, | Performed by: PHYSICIAN ASSISTANT

## 2021-10-20 PROCEDURE — 3288F PR FALLS RISK ASSESSMENT DOCUMENTED: ICD-10-PCS | Mod: CPTII,S$GLB,, | Performed by: PHYSICIAN ASSISTANT

## 2021-10-20 PROCEDURE — 1126F PR PAIN SEVERITY QUANTIFIED, NO PAIN PRESENT: ICD-10-PCS | Mod: CPTII,S$GLB,, | Performed by: PHYSICIAN ASSISTANT

## 2021-10-20 PROCEDURE — 1160F PR REVIEW ALL MEDS BY PRESCRIBER/CLIN PHARMACIST DOCUMENTED: ICD-10-PCS | Mod: CPTII,S$GLB,, | Performed by: PHYSICIAN ASSISTANT

## 2021-10-20 PROCEDURE — 99999 PR PBB SHADOW E&M-EST. PATIENT-LVL III: CPT | Mod: PBBFAC,,, | Performed by: PHYSICIAN ASSISTANT

## 2021-10-20 PROCEDURE — 1159F PR MEDICATION LIST DOCUMENTED IN MEDICAL RECORD: ICD-10-PCS | Mod: CPTII,S$GLB,, | Performed by: PHYSICIAN ASSISTANT

## 2021-10-20 PROCEDURE — 3077F SYST BP >= 140 MM HG: CPT | Mod: CPTII,S$GLB,, | Performed by: PHYSICIAN ASSISTANT

## 2021-10-20 PROCEDURE — 99213 OFFICE O/P EST LOW 20 MIN: CPT | Mod: S$GLB,,, | Performed by: PHYSICIAN ASSISTANT

## 2021-10-20 PROCEDURE — 1159F MED LIST DOCD IN RCRD: CPT | Mod: CPTII,S$GLB,, | Performed by: PHYSICIAN ASSISTANT

## 2021-10-20 PROCEDURE — 99999 PR PBB SHADOW E&M-EST. PATIENT-LVL III: ICD-10-PCS | Mod: PBBFAC,,, | Performed by: PHYSICIAN ASSISTANT

## 2021-10-20 PROCEDURE — 3008F PR BODY MASS INDEX (BMI) DOCUMENTED: ICD-10-PCS | Mod: CPTII,S$GLB,, | Performed by: PHYSICIAN ASSISTANT

## 2021-10-20 PROCEDURE — 1157F PR ADVANCE CARE PLAN OR EQUIV PRESENT IN MEDICAL RECORD: ICD-10-PCS | Mod: CPTII,S$GLB,, | Performed by: PHYSICIAN ASSISTANT

## 2021-10-20 PROCEDURE — 3077F PR MOST RECENT SYSTOLIC BLOOD PRESSURE >= 140 MM HG: ICD-10-PCS | Mod: CPTII,S$GLB,, | Performed by: PHYSICIAN ASSISTANT

## 2021-10-20 PROCEDURE — 3288F FALL RISK ASSESSMENT DOCD: CPT | Mod: CPTII,S$GLB,, | Performed by: PHYSICIAN ASSISTANT

## 2021-10-20 PROCEDURE — 3078F PR MOST RECENT DIASTOLIC BLOOD PRESSURE < 80 MM HG: ICD-10-PCS | Mod: CPTII,S$GLB,, | Performed by: PHYSICIAN ASSISTANT

## 2021-10-20 PROCEDURE — 1126F AMNT PAIN NOTED NONE PRSNT: CPT | Mod: CPTII,S$GLB,, | Performed by: PHYSICIAN ASSISTANT

## 2021-10-20 PROCEDURE — 99213 PR OFFICE/OUTPT VISIT, EST, LEVL III, 20-29 MIN: ICD-10-PCS | Mod: S$GLB,,, | Performed by: PHYSICIAN ASSISTANT

## 2021-10-20 PROCEDURE — 1101F PR PT FALLS ASSESS DOC 0-1 FALLS W/OUT INJ PAST YR: ICD-10-PCS | Mod: CPTII,S$GLB,, | Performed by: PHYSICIAN ASSISTANT

## 2021-10-20 PROCEDURE — 3008F BODY MASS INDEX DOCD: CPT | Mod: CPTII,S$GLB,, | Performed by: PHYSICIAN ASSISTANT

## 2021-11-04 ENCOUNTER — HOSPITAL ENCOUNTER (OUTPATIENT)
Dept: RADIOLOGY | Facility: HOSPITAL | Age: 66
Discharge: HOME | End: 2021-11-04
Attending: PHYSICIAN ASSISTANT
Payer: MEDICARE

## 2021-11-04 DIAGNOSIS — Z85.3 PERSONAL HISTORY OF BREAST CANCER: ICD-10-CM

## 2021-11-04 DIAGNOSIS — Z12.31 SCREENING MAMMOGRAM, ENCOUNTER FOR: ICD-10-CM

## 2021-11-04 PROCEDURE — 77063 MAMMO DIGITAL SCREENING BILAT WITH TOMO: ICD-10-PCS | Mod: 26,,, | Performed by: RADIOLOGY

## 2021-11-04 PROCEDURE — 77067 MAMMO DIGITAL SCREENING BILAT WITH TOMO: ICD-10-PCS | Mod: 26,,, | Performed by: RADIOLOGY

## 2021-11-04 PROCEDURE — 77067 SCR MAMMO BI INCL CAD: CPT | Mod: TC

## 2021-11-04 PROCEDURE — 77067 SCR MAMMO BI INCL CAD: CPT | Mod: 26,,, | Performed by: RADIOLOGY

## 2021-11-04 PROCEDURE — 77063 BREAST TOMOSYNTHESIS BI: CPT | Mod: 26,,, | Performed by: RADIOLOGY

## 2021-12-05 ENCOUNTER — HOSPITAL ENCOUNTER (OUTPATIENT)
Facility: HOSPITAL | Age: 66
Discharge: HOME | End: 2021-12-07
Attending: EMERGENCY MEDICINE | Admitting: INTERNAL MEDICINE
Payer: MEDICARE

## 2021-12-05 DIAGNOSIS — I10 PRIMARY HYPERTENSION: Primary | Chronic | ICD-10-CM

## 2021-12-05 DIAGNOSIS — R53.81 DEBILITY: ICD-10-CM

## 2021-12-05 DIAGNOSIS — R07.9 CHEST PAIN: ICD-10-CM

## 2021-12-05 DIAGNOSIS — E87.70 FLUID OVERLOAD: ICD-10-CM

## 2021-12-05 DIAGNOSIS — D05.12 DUCTAL CARCINOMA IN SITU (DCIS) OF LEFT BREAST: ICD-10-CM

## 2021-12-05 DIAGNOSIS — N18.6 ESRD (END STAGE RENAL DISEASE): Chronic | ICD-10-CM

## 2021-12-05 DIAGNOSIS — J81.0 ACUTE PULMONARY EDEMA: ICD-10-CM

## 2021-12-05 LAB
ALBUMIN SERPL-MCNC: 3.4 G/DL (ref 3.3–5.5)
ALP SERPL-CCNC: 102 U/L (ref 42–141)
BILIRUB SERPL-MCNC: 0.6 MG/DL (ref 0.2–1.6)
BUN SERPL-MCNC: 70 MG/DL (ref 7–22)
CALCIUM SERPL-MCNC: 10.3 MG/DL (ref 8–10.3)
CHLORIDE SERPL-SCNC: 101 MMOL/L (ref 98–108)
CREAT SERPL-MCNC: 9.7 MG/DL (ref 0.6–1.2)
CTP QC/QA: YES
GLUCOSE SERPL-MCNC: 97 MG/DL (ref 73–118)
POC ALT (SGPT): 72 U/L (ref 10–47)
POC AST (SGOT): 53 U/L (ref 11–38)
POC B-TYPE NATRIURETIC PEPTIDE: 1720 PG/ML (ref 0–100)
POC CARDIAC TROPONIN I: 0.03 NG/ML
POC PTINR: 1 (ref 0.9–1.2)
POC PTWBT: 12.4 SEC (ref 9.7–14.3)
POC TCO2: 28 MMOL/L (ref 18–33)
POTASSIUM BLD-SCNC: 4.4 MMOL/L (ref 3.6–5.1)
PROTEIN, POC: 6.5 G/DL (ref 6.4–8.1)
SAMPLE: NORMAL
SAMPLE: NORMAL
SARS-COV-2 RDRP RESP QL NAA+PROBE: NEGATIVE
SODIUM BLD-SCNC: 143 MMOL/L (ref 128–145)

## 2021-12-05 PROCEDURE — 93005 ELECTROCARDIOGRAM TRACING: CPT | Mod: ER

## 2021-12-05 PROCEDURE — 83880 ASSAY OF NATRIURETIC PEPTIDE: CPT | Mod: ER

## 2021-12-05 PROCEDURE — 85025 COMPLETE CBC W/AUTO DIFF WBC: CPT | Mod: ER

## 2021-12-05 PROCEDURE — 96374 THER/PROPH/DIAG INJ IV PUSH: CPT | Mod: 59 | Performed by: EMERGENCY MEDICINE

## 2021-12-05 PROCEDURE — 80053 COMPREHEN METABOLIC PANEL: CPT | Mod: ER

## 2021-12-05 PROCEDURE — G0257 UNSCHED DIALYSIS ESRD PT HOS: HCPCS

## 2021-12-05 PROCEDURE — 25000003 PHARM REV CODE 250: Performed by: HOSPITALIST

## 2021-12-05 PROCEDURE — 84484 ASSAY OF TROPONIN QUANT: CPT | Mod: ER

## 2021-12-05 PROCEDURE — 63600175 PHARM REV CODE 636 W HCPCS: Performed by: HOSPITALIST

## 2021-12-05 PROCEDURE — 93010 EKG 12-LEAD: ICD-10-PCS | Mod: ,,, | Performed by: INTERNAL MEDICINE

## 2021-12-05 PROCEDURE — G0378 HOSPITAL OBSERVATION PER HR: HCPCS

## 2021-12-05 PROCEDURE — 93010 ELECTROCARDIOGRAM REPORT: CPT | Mod: ,,, | Performed by: INTERNAL MEDICINE

## 2021-12-05 PROCEDURE — 85610 PROTHROMBIN TIME: CPT | Mod: ER

## 2021-12-05 PROCEDURE — U0002 COVID-19 LAB TEST NON-CDC: HCPCS | Mod: ER | Performed by: NURSE PRACTITIONER

## 2021-12-05 PROCEDURE — 99285 EMERGENCY DEPT VISIT HI MDM: CPT | Mod: 25,ER

## 2021-12-05 RX ORDER — IBUPROFEN 200 MG
16 TABLET ORAL
Status: DISCONTINUED | OUTPATIENT
Start: 2021-12-05 | End: 2021-12-07 | Stop reason: HOSPADM

## 2021-12-05 RX ORDER — POLYETHYLENE GLYCOL 3350 17 G/17G
17 POWDER, FOR SOLUTION ORAL DAILY
Status: DISCONTINUED | OUTPATIENT
Start: 2021-12-05 | End: 2021-12-07 | Stop reason: HOSPADM

## 2021-12-05 RX ORDER — IPRATROPIUM BROMIDE AND ALBUTEROL SULFATE 2.5; .5 MG/3ML; MG/3ML
3 SOLUTION RESPIRATORY (INHALATION) EVERY 4 HOURS PRN
Status: DISCONTINUED | OUTPATIENT
Start: 2021-12-05 | End: 2021-12-07 | Stop reason: HOSPADM

## 2021-12-05 RX ORDER — SODIUM CHLORIDE 0.9 % (FLUSH) 0.9 %
10 SYRINGE (ML) INJECTION EVERY 8 HOURS PRN
Status: DISCONTINUED | OUTPATIENT
Start: 2021-12-05 | End: 2021-12-07 | Stop reason: HOSPADM

## 2021-12-05 RX ORDER — MUPIROCIN 20 MG/G
OINTMENT TOPICAL 2 TIMES DAILY
Status: DISCONTINUED | OUTPATIENT
Start: 2021-12-05 | End: 2021-12-07 | Stop reason: HOSPADM

## 2021-12-05 RX ORDER — PROCHLORPERAZINE EDISYLATE 5 MG/ML
5 INJECTION INTRAMUSCULAR; INTRAVENOUS EVERY 6 HOURS PRN
Status: DISCONTINUED | OUTPATIENT
Start: 2021-12-05 | End: 2021-12-07 | Stop reason: HOSPADM

## 2021-12-05 RX ORDER — HYDROXYZINE HYDROCHLORIDE 25 MG/1
25 TABLET, FILM COATED ORAL 3 TIMES DAILY PRN
Status: DISCONTINUED | OUTPATIENT
Start: 2021-12-05 | End: 2021-12-07 | Stop reason: HOSPADM

## 2021-12-05 RX ORDER — MAG HYDROX/ALUMINUM HYD/SIMETH 200-200-20
30 SUSPENSION, ORAL (FINAL DOSE FORM) ORAL 4 TIMES DAILY PRN
Status: DISCONTINUED | OUTPATIENT
Start: 2021-12-05 | End: 2021-12-07 | Stop reason: HOSPADM

## 2021-12-05 RX ORDER — NAPROXEN SODIUM 220 MG/1
81 TABLET, FILM COATED ORAL DAILY
Status: DISCONTINUED | OUTPATIENT
Start: 2021-12-05 | End: 2021-12-07 | Stop reason: HOSPADM

## 2021-12-05 RX ORDER — ACETAMINOPHEN 325 MG/1
650 TABLET ORAL EVERY 6 HOURS PRN
Status: DISCONTINUED | OUTPATIENT
Start: 2021-12-05 | End: 2021-12-07 | Stop reason: HOSPADM

## 2021-12-05 RX ORDER — NALOXONE HCL 0.4 MG/ML
0.02 VIAL (ML) INJECTION
Status: DISCONTINUED | OUTPATIENT
Start: 2021-12-05 | End: 2021-12-07 | Stop reason: HOSPADM

## 2021-12-05 RX ORDER — ONDANSETRON 2 MG/ML
4 INJECTION INTRAMUSCULAR; INTRAVENOUS EVERY 8 HOURS PRN
Status: DISCONTINUED | OUTPATIENT
Start: 2021-12-05 | End: 2021-12-07 | Stop reason: HOSPADM

## 2021-12-05 RX ORDER — CALCIUM ACETATE 667 MG/1
2668 CAPSULE ORAL
Status: DISCONTINUED | OUTPATIENT
Start: 2021-12-05 | End: 2021-12-07 | Stop reason: HOSPADM

## 2021-12-05 RX ORDER — GLUCAGON 1 MG
1 KIT INJECTION
Status: DISCONTINUED | OUTPATIENT
Start: 2021-12-05 | End: 2021-12-07 | Stop reason: HOSPADM

## 2021-12-05 RX ORDER — TALC
6 POWDER (GRAM) TOPICAL NIGHTLY PRN
Status: DISCONTINUED | OUTPATIENT
Start: 2021-12-05 | End: 2021-12-07 | Stop reason: HOSPADM

## 2021-12-05 RX ORDER — FUROSEMIDE 10 MG/ML
40 INJECTION INTRAMUSCULAR; INTRAVENOUS ONCE
Status: COMPLETED | OUTPATIENT
Start: 2021-12-05 | End: 2021-12-05

## 2021-12-05 RX ORDER — SIMETHICONE 80 MG
1 TABLET,CHEWABLE ORAL 4 TIMES DAILY PRN
Status: DISCONTINUED | OUTPATIENT
Start: 2021-12-05 | End: 2021-12-07 | Stop reason: HOSPADM

## 2021-12-05 RX ORDER — IBUPROFEN 200 MG
24 TABLET ORAL
Status: DISCONTINUED | OUTPATIENT
Start: 2021-12-05 | End: 2021-12-07 | Stop reason: HOSPADM

## 2021-12-05 RX ORDER — SODIUM CHLORIDE 9 MG/ML
INJECTION, SOLUTION INTRAVENOUS ONCE
Status: DISCONTINUED | OUTPATIENT
Start: 2021-12-05 | End: 2021-12-07 | Stop reason: HOSPADM

## 2021-12-05 RX ORDER — SODIUM CHLORIDE 9 MG/ML
INJECTION, SOLUTION INTRAVENOUS
Status: DISCONTINUED | OUTPATIENT
Start: 2021-12-05 | End: 2021-12-07 | Stop reason: HOSPADM

## 2021-12-05 RX ADMIN — ASPIRIN 81 MG CHEWABLE TABLET 81 MG: 81 TABLET CHEWABLE at 06:12

## 2021-12-05 RX ADMIN — NITROGLYCERIN 1 INCH: 20 OINTMENT TOPICAL at 07:12

## 2021-12-05 RX ADMIN — FUROSEMIDE 40 MG: 10 INJECTION, SOLUTION INTRAMUSCULAR; INTRAVENOUS at 07:12

## 2021-12-06 PROCEDURE — 25000003 PHARM REV CODE 250: Performed by: STUDENT IN AN ORGANIZED HEALTH CARE EDUCATION/TRAINING PROGRAM

## 2021-12-06 PROCEDURE — 25000003 PHARM REV CODE 250: Performed by: INTERNAL MEDICINE

## 2021-12-06 PROCEDURE — 25000003 PHARM REV CODE 250: Performed by: HOSPITALIST

## 2021-12-06 PROCEDURE — G0378 HOSPITAL OBSERVATION PER HR: HCPCS

## 2021-12-06 PROCEDURE — G0257 UNSCHED DIALYSIS ESRD PT HOS: HCPCS

## 2021-12-06 PROCEDURE — 80100016 HC MAINTENANCE HEMODIALYSIS

## 2021-12-06 PROCEDURE — 99900035 HC TECH TIME PER 15 MIN (STAT)

## 2021-12-06 RX ORDER — HYDRALAZINE HYDROCHLORIDE 25 MG/1
50 TABLET, FILM COATED ORAL EVERY 8 HOURS
Status: DISCONTINUED | OUTPATIENT
Start: 2021-12-06 | End: 2021-12-07 | Stop reason: HOSPADM

## 2021-12-06 RX ORDER — SODIUM CHLORIDE 9 MG/ML
INJECTION, SOLUTION INTRAVENOUS ONCE
Status: DISCONTINUED | OUTPATIENT
Start: 2021-12-06 | End: 2021-12-07 | Stop reason: HOSPADM

## 2021-12-06 RX ADMIN — Medication 6 MG: at 01:12

## 2021-12-06 RX ADMIN — CALCIUM ACETATE 2668 MG: 667 CAPSULE ORAL at 11:12

## 2021-12-06 RX ADMIN — HYDRALAZINE HYDROCHLORIDE 50 MG: 25 TABLET ORAL at 08:12

## 2021-12-06 RX ADMIN — ASPIRIN 81 MG CHEWABLE TABLET 81 MG: 81 TABLET CHEWABLE at 09:12

## 2021-12-06 RX ADMIN — ACETAMINOPHEN 650 MG: 325 TABLET ORAL at 01:12

## 2021-12-06 RX ADMIN — MUPIROCIN: 20 OINTMENT TOPICAL at 09:12

## 2021-12-06 RX ADMIN — HYDRALAZINE HYDROCHLORIDE 50 MG: 25 TABLET ORAL at 09:12

## 2021-12-06 RX ADMIN — CALCIUM ACETATE 2668 MG: 667 CAPSULE ORAL at 09:12

## 2021-12-06 RX ADMIN — CALCIUM ACETATE 2668 MG: 667 CAPSULE ORAL at 05:12

## 2021-12-06 RX ADMIN — POLYETHYLENE GLYCOL 3350 17 G: 17 POWDER, FOR SOLUTION ORAL at 09:12

## 2021-12-06 RX ADMIN — Medication 6 MG: at 08:12

## 2021-12-06 RX ADMIN — MUPIROCIN: 20 OINTMENT TOPICAL at 08:12

## 2021-12-07 VITALS
BODY MASS INDEX: 28.28 KG/M2 | DIASTOLIC BLOOD PRESSURE: 65 MMHG | TEMPERATURE: 99 F | SYSTOLIC BLOOD PRESSURE: 137 MMHG | HEART RATE: 98 BPM | OXYGEN SATURATION: 98 % | RESPIRATION RATE: 17 BRPM | WEIGHT: 153.69 LBS | HEIGHT: 62 IN

## 2021-12-07 PROCEDURE — 25000003 PHARM REV CODE 250: Performed by: STUDENT IN AN ORGANIZED HEALTH CARE EDUCATION/TRAINING PROGRAM

## 2021-12-07 PROCEDURE — G0378 HOSPITAL OBSERVATION PER HR: HCPCS

## 2021-12-07 PROCEDURE — 97161 PT EVAL LOW COMPLEX 20 MIN: CPT

## 2021-12-07 PROCEDURE — 25000003 PHARM REV CODE 250: Performed by: HOSPITALIST

## 2021-12-07 RX ORDER — HYDRALAZINE HYDROCHLORIDE 50 MG/1
50 TABLET, FILM COATED ORAL EVERY 8 HOURS
Qty: 90 TABLET | Refills: 1 | Status: SHIPPED | OUTPATIENT
Start: 2021-12-07 | End: 2022-02-05

## 2021-12-07 RX ORDER — CALCIUM ACETATE 667 MG/1
2668 CAPSULE ORAL
Qty: 360 CAPSULE | Refills: 0 | Status: SHIPPED | OUTPATIENT
Start: 2021-12-07 | End: 2022-01-06

## 2021-12-07 RX ADMIN — CALCIUM ACETATE 2668 MG: 667 CAPSULE ORAL at 09:12

## 2021-12-07 RX ADMIN — MUPIROCIN: 20 OINTMENT TOPICAL at 09:12

## 2021-12-07 RX ADMIN — POLYETHYLENE GLYCOL 3350 17 G: 17 POWDER, FOR SOLUTION ORAL at 09:12

## 2021-12-07 RX ADMIN — ASPIRIN 81 MG CHEWABLE TABLET 81 MG: 81 TABLET CHEWABLE at 09:12

## 2021-12-07 RX ADMIN — HYDRALAZINE HYDROCHLORIDE 50 MG: 25 TABLET ORAL at 07:12

## 2022-03-07 PROBLEM — J81.0 ACUTE PULMONARY EDEMA: Status: RESOLVED | Noted: 2021-12-05 | Resolved: 2022-03-07

## 2023-02-07 DIAGNOSIS — Z12.31 ENCOUNTER FOR SCREENING MAMMOGRAM FOR BREAST CANCER: Primary | ICD-10-CM

## 2024-01-04 ENCOUNTER — OFFICE VISIT (OUTPATIENT)
Dept: SURGERY | Facility: CLINIC | Age: 69
End: 2024-01-04
Attending: PHYSICIAN ASSISTANT
Payer: MEDICARE

## 2024-01-04 ENCOUNTER — HOSPITAL ENCOUNTER (OUTPATIENT)
Dept: RADIOLOGY | Facility: HOSPITAL | Age: 69
Discharge: HOME | End: 2024-01-04
Attending: PHYSICIAN ASSISTANT
Payer: MEDICARE

## 2024-01-04 VITALS
DIASTOLIC BLOOD PRESSURE: 61 MMHG | HEART RATE: 85 BPM | HEIGHT: 62 IN | BODY MASS INDEX: 28.16 KG/M2 | WEIGHT: 153 LBS | SYSTOLIC BLOOD PRESSURE: 124 MMHG

## 2024-01-04 DIAGNOSIS — Z12.39 ENCOUNTER FOR SCREENING BREAST EXAMINATION: ICD-10-CM

## 2024-01-04 DIAGNOSIS — Z90.12 H/O PARTIAL MASTECTOMY, LEFT: ICD-10-CM

## 2024-01-04 DIAGNOSIS — N64.89 BREAST ASYMMETRY: ICD-10-CM

## 2024-01-04 DIAGNOSIS — Z12.31 ENCOUNTER FOR SCREENING MAMMOGRAM FOR BREAST CANCER: ICD-10-CM

## 2024-01-04 DIAGNOSIS — Z85.3 PERSONAL HISTORY OF BREAST CANCER: Primary | ICD-10-CM

## 2024-01-04 PROCEDURE — 3074F SYST BP LT 130 MM HG: CPT | Mod: CPTII,S$GLB,, | Performed by: NURSE PRACTITIONER

## 2024-01-04 PROCEDURE — 77067 SCR MAMMO BI INCL CAD: CPT | Mod: 26,,, | Performed by: RADIOLOGY

## 2024-01-04 PROCEDURE — 1157F ADVNC CARE PLAN IN RCRD: CPT | Mod: CPTII,S$GLB,, | Performed by: NURSE PRACTITIONER

## 2024-01-04 PROCEDURE — 77067 SCR MAMMO BI INCL CAD: CPT | Mod: TC

## 2024-01-04 PROCEDURE — 1101F PT FALLS ASSESS-DOCD LE1/YR: CPT | Mod: CPTII,S$GLB,, | Performed by: NURSE PRACTITIONER

## 2024-01-04 PROCEDURE — 77063 BREAST TOMOSYNTHESIS BI: CPT | Mod: 26,,, | Performed by: RADIOLOGY

## 2024-01-04 PROCEDURE — 99213 OFFICE O/P EST LOW 20 MIN: CPT | Mod: S$GLB,,, | Performed by: NURSE PRACTITIONER

## 2024-01-04 PROCEDURE — 99999 PR PBB SHADOW E&M-EST. PATIENT-LVL III: CPT | Mod: PBBFAC,,, | Performed by: NURSE PRACTITIONER

## 2024-01-04 PROCEDURE — 1159F MED LIST DOCD IN RCRD: CPT | Mod: CPTII,S$GLB,, | Performed by: NURSE PRACTITIONER

## 2024-01-04 PROCEDURE — 3288F FALL RISK ASSESSMENT DOCD: CPT | Mod: CPTII,S$GLB,, | Performed by: NURSE PRACTITIONER

## 2024-01-04 PROCEDURE — 1126F AMNT PAIN NOTED NONE PRSNT: CPT | Mod: CPTII,S$GLB,, | Performed by: NURSE PRACTITIONER

## 2024-01-04 PROCEDURE — 3008F BODY MASS INDEX DOCD: CPT | Mod: CPTII,S$GLB,, | Performed by: NURSE PRACTITIONER

## 2024-01-04 PROCEDURE — 3078F DIAST BP <80 MM HG: CPT | Mod: CPTII,S$GLB,, | Performed by: NURSE PRACTITIONER

## 2024-01-04 NOTE — PROGRESS NOTES
[facility]  Department of Surgery    2024    REFERRING PROVIDER:   Thu Richards PA-C  [address] [place] [address]    Chief Complaint: Annual CBE and Mammogram      SUBJECTIVE:   Ms. Archana Ward is a 68 y.o. female with a history of stage 0 Tis (3 cm) NxMx, grade 3, ER -, [place] - DCIS of the left breast who presents today for follow up breast cancer screening.       TREATMENT:   1. Left partial mastectomy without sentinel node biopsy on 2015. Rachel Saldana M.D. Surgical Oncology  2. Reexcision left breast 2015. Rachel Saldana, Surgical Oncology.  3. Radiation therapy from 2016  To 3/2016. Lucy Arevalo M.D. Radiation Oncology      Interval History:  Patient states she has been doing well since she was last seen. T/TH/Sat Dialysis. Denies any changes to her breast exam such as palpable masses, skin changes or nipple discharge.  She denies any unexplained weight loss, changes in vision, or recent headaches.  She denies any bone pain. She would like breast prosthesis and bra Rx for asymmetry.     Review of Systems: Denies any chest pain or shortness of breath.  Denies any fever or chills.  See HPI/ Interval History for other systems reviewed.    Imagin/4/2021 Mammo Digital Screening Bilat w/ Jacques     History:  Patient is 66 y.o. and is seen for a screening mammogram.     Films Compared:  Compared to: 2020 Mammo Digital Diagnostic Left with Jacques, 2020 Mammo Digital Screening Bilat w/ Jacques, 2019 Mammo Digital Screening Bilat with Tomosynthesis_CAD, 2018 Mammo Digital Diagnostic Bilat with Tomosynthesis_CAD, and 2016 Mammo Digital Diagnostic Bilat with Tomosynthesis_CAD     Findings:  This procedure was performed using tomosynthesis. Computer-aided detection was utilized in the interpretation of this examination.  The breasts are heterogeneously dense, which may obscure small masses.      Left  There are post-surgical findings  "from a previous lumpectomy seen in the left breast. Compared to the previous study, there are no significant changes. There has been no interval development of a suspicious mass, microcalcification, or architectural distortion.      Right  There is no evidence of suspicious masses, calcifications, or other abnormal findings in the right breast.     Impression:  Bilateral  There is no mammographic evidence of malignancy.     BI-RADS Category:   Overall: 2 - Benign      Past Medical History:   Diagnosis Date    Anemia of chronic renal failure 8/6/2014    Breast cancer     ESRD 2/2 HTN on HD since 5/22/13 8/6/2014    Hypertension - Dx in 30s 8/6/2014    Kidney transplant candidate 8/6/2014    Secondary hyperparathyroidism of renal origin 8/6/2014     Past Surgical History:   Procedure Laterality Date    AV FISTULA PLACEMENT  2013    BREAST BIOPSY Bilateral     years ago    CHOLECYSTECTOMY  1980s    HYSTERECTOMY  1970's    SUBTOTAL PARATHYROIDECTOMY  9/19/2018    Procedure: PARATHYROIDECTOMY, SUBTOTAL;  Surgeon: Devin Nicholas MD;  Location: [facility] OR [address] [place] [address];  Service: General;;     Family History   Problem Relation Age of Onset    Heart disease Father     Breast cancer Neg Hx     Cancer Neg Hx     Colon cancer Neg Hx     Ovarian cancer Neg Hx        OBJECTIVE:      Vitals:    01/04/24 0902   BP: 124/61   Pulse: 85   Weight: 69.4 kg (153 lb)   Height: 5' 2" (1.575 m)     Physical Exam   Constitutional: She is oriented to person, place, and time. She appears well-developed. No distress.   HENT:   Head: Normocephalic and atraumatic.   Eyes: Pupils are equal, round, and reactive to light.   Pulmonary/Chest: Effort normal and breath sounds normal. No respiratory distress. She exhibits no mass, no tenderness and no edema. Right breast exhibits no inverted nipple, no mass, no nipple discharge, no skin change and no tenderness. Left breast exhibits no inverted nipple, no mass, no nipple discharge, no skin change and no " tenderness. Breasts are symmetrical.       Neurological: She is alert and oriented to person, place, and time.   Skin: Skin is warm and dry. No rash noted. She is not diaphoretic. No erythema.     Psychiatric: Her behavior is normal.       ASSESSMENT:     Ms. Archana Ward is a 68 y.o. year old female with a history of LEFT breast cancer who presents today for follow up breast cancer screening.      PLAN:   1. Patient without concerning findings on CBE.   2. Mammogram today, final read pending    3. Advised to continue with self breast exam and to call with any new or concerning findings.   4. Will see back in 1 year with mmg.   5. Rx given for padding to help with asymmetry after lumpectomy of the left breast.     The patient is in agreement with the plan. Questions were encouraged and answered to patient's satisfaction. Archana will call our office with any questions or concerns.

## 2024-01-08 ENCOUNTER — TELEPHONE (OUTPATIENT)
Dept: SURGERY | Facility: CLINIC | Age: 69
End: 2024-01-08
Payer: MEDICARE

## 2024-07-01 ENCOUNTER — HOSPITAL ENCOUNTER (EMERGENCY)
Facility: HOSPITAL | Age: 69
Discharge: HOME | End: 2024-07-01
Attending: EMERGENCY MEDICINE
Payer: MEDICARE

## 2024-07-01 VITALS
SYSTOLIC BLOOD PRESSURE: 165 MMHG | BODY MASS INDEX: 26.34 KG/M2 | RESPIRATION RATE: 18 BRPM | OXYGEN SATURATION: 99 % | HEART RATE: 80 BPM | DIASTOLIC BLOOD PRESSURE: 70 MMHG | TEMPERATURE: 99 F | WEIGHT: 144 LBS

## 2024-07-01 DIAGNOSIS — M25.562 ACUTE PAIN OF LEFT KNEE: Primary | ICD-10-CM

## 2024-07-01 PROCEDURE — 25000003 PHARM REV CODE 250: Mod: ER

## 2024-07-01 PROCEDURE — 63600175 PHARM REV CODE 636 W HCPCS: Mod: ER

## 2024-07-01 PROCEDURE — 96372 THER/PROPH/DIAG INJ SC/IM: CPT

## 2024-07-01 PROCEDURE — 99284 EMERGENCY DEPT VISIT MOD MDM: CPT | Mod: 25,ER

## 2024-07-01 RX ORDER — ACETAMINOPHEN 500 MG
500 TABLET ORAL EVERY 6 HOURS PRN
Qty: 20 TABLET | Refills: 0 | Status: SHIPPED | OUTPATIENT
Start: 2024-07-01

## 2024-07-01 RX ORDER — METHOCARBAMOL 500 MG/1
500 TABLET, FILM COATED ORAL 4 TIMES DAILY
Qty: 20 TABLET | Refills: 0 | Status: SHIPPED | OUTPATIENT
Start: 2024-07-01 | End: 2024-07-06

## 2024-07-01 RX ORDER — ACETAMINOPHEN 325 MG/1
650 TABLET ORAL
Status: COMPLETED | OUTPATIENT
Start: 2024-07-01 | End: 2024-07-01

## 2024-07-01 RX ORDER — DEXAMETHASONE SODIUM PHOSPHATE 4 MG/ML
8 INJECTION, SOLUTION INTRA-ARTICULAR; INTRALESIONAL; INTRAMUSCULAR; INTRAVENOUS; SOFT TISSUE
Status: COMPLETED | OUTPATIENT
Start: 2024-07-01 | End: 2024-07-01

## 2024-07-01 RX ADMIN — ACETAMINOPHEN 650 MG: 325 TABLET ORAL at 07:07

## 2024-07-01 RX ADMIN — DEXAMETHASONE SODIUM PHOSPHATE 8 MG: 4 INJECTION INTRA-ARTICULAR; INTRALESIONAL; INTRAMUSCULAR; INTRAVENOUS; SOFT TISSUE at 07:07

## 2024-07-02 NOTE — DISCHARGE INSTRUCTIONS
You were seen in the emergency department today for left knee pain.  Please take Tylenol and Robaxin as prescribed for symptoms.  Follow up with your PCP.  It is important to remember that some problems are difficult to diagnose and may not be found during your Emergency Department visit. Be sure to follow up with your primary care doctor and review all labs/imaging/tests that were performed during this visit with them. Some labs/tests may be outside of the normal range and require non-emergent follow-up and further investigation to help diagnose/exclude/prevent complications or other medical conditions. Return to the emergency department for any new or worsening symptoms. Thank you for allowing me to care for you today, it was my pleasure. I hope you get to feeling better soon!

## 2024-07-02 NOTE — ED PROVIDER NOTES
Encounter Date: 2024       History     Chief Complaint   Patient presents with    Knee Pain     Bilateral knee pain starting today   Denies injury      Patient is a 69-year-old female with a past medical history of end-stage renal disease, anemia due to kidney disease, hypertension who presents to the emergency department for evaluation of left knee pain x 2 days.  She denies trauma or injury.  Reports mild swelling.  Denies redness.  Denies numbness or tingling.  Denies fever, chills.  Denies known history of arthritis.  No other complaints today.    The history is provided by the patient.     Review of patient's allergies indicates:  No Known Allergies  Past Medical History:   Diagnosis Date    Anemia of chronic renal failure 2014    Breast cancer     ESRD 2/2 HTN on HD since 11    Hypertension - Dx in 30s 2014    Kidney transplant candidate 2014    Secondary hyperparathyroidism of renal origin 2014     Past Surgical History:   Procedure Laterality Date    AV FISTULA PLACEMENT      BREAST BIOPSY Bilateral     years ago    CHOLECYSTECTOMY      HYSTERECTOMY  's    SUBTOTAL PARATHYROIDECTOMY  2018    Procedure: PARATHYROIDECTOMY, SUBTOTAL;  Surgeon: Devin Nicholas MD;  Location: [facility] OR [address] [place] [address];  Service: General;;     Family History   Problem Relation Name Age of Onset    Heart disease Father      Breast cancer Neg Hx      Cancer Neg Hx      Colon cancer Neg Hx      Ovarian cancer Neg Hx       Social History     Tobacco Use    Smoking status: Former     Current packs/day: 0.00     Average packs/day: 0.5 packs/day for 10.0 years (5.0 ttl pk-yrs)     Types: Cigarettes     Start date: 2002     Quit date: 2012     Years since quittin.9    Smokeless tobacco: Never   Substance Use Topics    Alcohol use: No     Alcohol/week: 0.0 standard drinks of alcohol    Drug use: No     Review of Systems   Constitutional:  Negative for chills and fever.    Respiratory:  Negative for shortness of breath.    Cardiovascular:  Negative for chest pain.   Gastrointestinal:  Negative for abdominal pain, nausea and vomiting.   Musculoskeletal:  Positive for arthralgias and joint swelling. Negative for neck pain and neck stiffness.   Skin:  Negative for color change.   Neurological:  Negative for dizziness, light-headedness and headaches.       Physical Exam     Initial Vitals [07/01/24 1807]   BP Pulse Resp Temp SpO2   (!) 170/72 84 20 98.6 °F (37 °C) 97 %      MAP       --         Physical Exam    Nursing note and vitals reviewed.  Constitutional: She appears well-developed and well-nourished.   HENT:   Head: Normocephalic and atraumatic.   Right Ear: External ear normal.   Left Ear: External ear normal.   Neck: Carotid bruit is not present.   Normal range of motion.  Cardiovascular:  Normal rate, regular rhythm, normal heart sounds and intact distal pulses.     Exam reveals no gallop and no friction rub.       No murmur heard.  Pulmonary/Chest: Breath sounds normal. No respiratory distress. She has no wheezes. She has no rhonchi. She has no rales.   Abdominal: Abdomen is soft. Bowel sounds are normal. She exhibits no distension. There is no abdominal tenderness. There is no rebound and no guarding.   Musculoskeletal:      Cervical back: Normal range of motion.      Comments: There is normal range of motion of the left knee but there is crepitus present.  Mild swelling.  No overlying color change.  No obvious deformity.  Neurovascularly intact.     Neurological: She is alert and oriented to person, place, and time. GCS score is 15. GCS eye subscore is 4. GCS verbal subscore is 5. GCS motor subscore is 6.   Psychiatric: She has a normal mood and affect.         ED Course   Procedures  Labs Reviewed - No data to display       Imaging Results              X-Ray Knee 1 or 2 View Bilateral (Final result)  Result time 07/01/24 19:39:26      Final result by Jodie Ennis MD  (07/01/24 19:39:26)                   Impression:      No acute displaced fracture seen.  Small bilateral suprapatellar joint effusions.      Electronically signed by: Jodie Ennis MD  Date:    07/01/2024  Time:    19:39               Narrative:    EXAMINATION:  XR KNEE 1 OR 2 VIEW BILATERAL    CLINICAL HISTORY:  Pain in right knee    TECHNIQUE:  Bilateral knee AP and lateral views were obtained.    COMPARISON:  None    FINDINGS:  No evidence of acute displaced fracture, dislocation, or osseous destructive process.  Knee joint spaces appear fairly well preserved.  Small bilateral suprapatellar joint effusions are seen.                                       Medications   acetaminophen tablet 650 mg (650 mg Oral Given 7/1/24 1937)   dexAMETHasone injection 8 mg (8 mg Intramuscular Given 7/1/24 1937)     Medical Decision Making  This is an emergent evaluation of a 69-year-old female with a past medical history of end-stage renal disease, anemia due to kidney disease, hypertension who presents to the emergency department for evaluation of left knee pain x 2 days.    Patient looks well clinically. There is normal range of motion of the left knee but there is crepitus present.  Mild swelling.  No overlying color change.  No obvious deformity.  Neurovascularly intact. Regular rate rhythm without murmurs.  No carotid bruits appreciated on exam. Lungs are clear to auscultation bilaterally.  Abdomen is soft, nontender, non distended, with normal bowel sounds.     Differential diagnosis includes but is not limited to fracture, dislocation, sprain, arthritis.  Considered septic joint but highly doubtful given no joint erythema, swelling, mild range of motion, and no systemic symptoms.     Workup initiated with x-ray.  Ordered Decadron.  Vital signs, chart, labs, and/or imaging were all reviewed.  See ED course below and interpretations above. My overall impression is arthritis, small suprapatellar effusion. Will discharge  home with Tylenol, Robaxin. Patient is very well appearing, and in no acute distress. Vital signs are reassuring here in the emergency department, patient is afebrile, breathing comfortable, satting 97 % on room air. Patient/Caregiver is stable for discharge at this time.  Patient/Caregiver was informed of results and plan of care. Patient/Caregiver verbalized understanding of care plan. All questions and concerns were addressed. Discussed strict return precautions with the patient/caregiver. Instructed follow up with primary care provider within 1 week.      Cesar Montes PA-C    DISCLAIMER: This note was prepared with [employer] voice recognition transcription software. Garbled syntax, mangled pronouns, and other bizarre constructions may be attributed to that software system.       Amount and/or Complexity of Data Reviewed  Radiology: ordered. Decision-making details documented in ED Course.    Risk  OTC drugs.  Prescription drug management.               ED Course as of 07/01/24 1944 Mon Jul 01, 2024 1943 X-Ray Knee 1 or 2 View Bilateral  No evidence of acute displaced fracture, dislocation, or osseous destructive process.  Knee joint spaces appear fairly well preserved.  Small bilateral suprapatellar joint effusions are seen. [TM]      ED Course User Index  [TM] Cesar Montes PA-C                           Clinical Impression:  Final diagnoses:  [M25.562] Acute pain of left knee (Primary)          ED Disposition Condition    Discharge Stable          ED Prescriptions       Medication Sig Dispense Start Date End Date Auth. Provider    acetaminophen (TYLENOL) 500 MG tablet Take 1 tablet (500 mg total) by mouth every 6 (six) hours as needed. 20 tablet 7/1/2024 -- Cesar Montes PA-C    methocarbamoL (ROBAXIN) 500 MG Tab Take 1 tablet (500 mg total) by mouth 4 (four) times daily. for 5 days 20 tablet 7/1/2024 7/6/2024 Cesar Montes PA-C          Follow-up Information       Follow up With Specialties  Details Why Contact Info    Ashley Rodriguez MD General Practice   [address] [place] [address]  [id number]  [facility] [address]  [phone]      [facility] ED Emergency Medicine Go to  As needed, If symptoms worsen, or new symptoms develop [address] [facility] [place] [address] [phone]    Primary care doctor  Schedule an appointment as soon as possible for a visit in 3 days               Cesar Montes PA-C  07/01/24 1944

## 2024-07-10 ENCOUNTER — HOSPITAL ENCOUNTER (EMERGENCY)
Facility: HOSPITAL | Age: 69
Discharge: HOME | End: 2024-07-10
Attending: INTERNAL MEDICINE
Payer: MEDICARE

## 2024-07-10 VITALS
SYSTOLIC BLOOD PRESSURE: 130 MMHG | RESPIRATION RATE: 18 BRPM | OXYGEN SATURATION: 99 % | WEIGHT: 144 LBS | DIASTOLIC BLOOD PRESSURE: 82 MMHG | BODY MASS INDEX: 26.5 KG/M2 | HEIGHT: 62 IN | TEMPERATURE: 98 F | HEART RATE: 75 BPM

## 2024-07-10 DIAGNOSIS — N18.6 ESRD (END STAGE RENAL DISEASE): ICD-10-CM

## 2024-07-10 DIAGNOSIS — M25.469 SUPRAPATELLAR EFFUSION OF KNEE: ICD-10-CM

## 2024-07-10 DIAGNOSIS — M17.12 OSTEOARTHRITIS OF LEFT KNEE, UNSPECIFIED OSTEOARTHRITIS TYPE: Primary | ICD-10-CM

## 2024-07-10 PROBLEM — D63.1 ANEMIA IN ESRD (END-STAGE RENAL DISEASE): Status: ACTIVE | Noted: 2018-11-19

## 2024-07-10 PROBLEM — M51.369 DDD (DEGENERATIVE DISC DISEASE), LUMBAR: Status: ACTIVE | Noted: 2019-03-14

## 2024-07-10 PROBLEM — G89.29 CHRONIC LEFT SHOULDER PAIN: Status: ACTIVE | Noted: 2022-02-06

## 2024-07-10 PROBLEM — M51.36 DDD (DEGENERATIVE DISC DISEASE), LUMBAR: Status: ACTIVE | Noted: 2019-03-14

## 2024-07-10 PROBLEM — M25.512 CHRONIC LEFT SHOULDER PAIN: Status: ACTIVE | Noted: 2022-02-06

## 2024-07-10 PROBLEM — I77.0 AVF (ARTERIOVENOUS FISTULA): Status: ACTIVE | Noted: 2022-10-07

## 2024-07-10 PROBLEM — M50.30 DDD (DEGENERATIVE DISC DISEASE), CERVICAL: Status: ACTIVE | Noted: 2019-03-14

## 2024-07-10 PROBLEM — E78.5 DYSLIPIDEMIA: Status: ACTIVE | Noted: 2024-06-05

## 2024-07-10 PROCEDURE — 25000003 PHARM REV CODE 250: Mod: ER | Performed by: PHYSICIAN ASSISTANT

## 2024-07-10 PROCEDURE — 99284 EMERGENCY DEPT VISIT MOD MDM: CPT | Mod: ER

## 2024-07-10 RX ORDER — ACETAMINOPHEN 500 MG
1000 TABLET ORAL
Status: COMPLETED | OUTPATIENT
Start: 2024-07-10 | End: 2024-07-10

## 2024-07-10 RX ORDER — ACETAMINOPHEN 325 MG/1
650 TABLET ORAL EVERY 6 HOURS PRN
Qty: 30 TABLET | Refills: 0 | Status: SHIPPED | OUTPATIENT
Start: 2024-07-10 | End: 2024-07-14

## 2024-07-10 RX ORDER — DICLOFENAC SODIUM 10 MG/G
2 GEL TOPICAL 3 TIMES DAILY
Qty: 100 G | Refills: 0 | Status: SHIPPED | OUTPATIENT
Start: 2024-07-10 | End: 2024-07-20

## 2024-07-10 RX ADMIN — ACETAMINOPHEN 1000 MG: 500 TABLET, FILM COATED ORAL at 10:07

## 2024-07-11 NOTE — DISCHARGE INSTRUCTIONS
Thank you for coming to our Emergency Department today. It is important to remember that some problems or medical conditions are difficult to diagnose and may not be found or addressed during your Emergency Department visit.  These conditions often start with non-specific symptoms and can only be diagnosed on follow up visits with your primary care physician or specialist when the symptoms continue or change. Please remember that all medical conditions can change, and we cannot predict how you will be feeling tomorrow or the next day. Return to the ER with any questions/concerns, new/concerning symptoms, worsening or failure to improve.       Be sure to follow up with your primary care doctor and review all labs/imaging/tests that were performed during your ER visit with them. It is very common for us to identify non-emergent incidental findings which must be followed up with your primary care physician.  Some labs/imaging/tests may be outside of the normal range, and require non-emergent follow-up and/or further investigation/treatment/procedures/testing to help diagnose/exclude/prevent complications or other potentially serious medical conditions. Some abnormalities may not have been discussed or addressed during your ER visit.     An ER visit does not replace a primary care visit, and many screening tests or follow-up tests cannot be ordered by an ER doctor or performed by the ER. Some tests may even require pre-approval.    If you do not have a primary care doctor, you may contact the one listed on your discharge paperwork or you may also call the [facility] Appointment Desk at [phone] , or [address] [place] [address] at  [phone] to schedule an appointment, or establish care with a primary care doctor or even a specialist and to obtain information about local resources. It is important to your health that you have a primary care doctor.    Please take all medications as directed. We have done our best to select  a medication for you that will treat your condition however, all medications may potentially have side-effects and it is impossible to predict which medications may give you side-effects or what those side-effects (if any) those medications may give you.  If you feel that you are having a negative effect or side-effect of any medication you should stop taking those medications immediately and seek medical attention. If you feel that you are having a life-threatening reaction call 911.        Do not drive, swim, climb to height, take a bath, operate heavy machinery, drink alcohol or take potentially sedating medications, sign any legal documents or make any important decisions for 24 hours if you have received any pain medications, sedatives or mood altering drugs during your ER visit or within 24 hours of taking them if they have been prescribed to you.     You can find additional resources for Dentists, hearing aids, durable medical equipment, low cost pharmacies and other resources at [email or web address]

## 2024-07-11 NOTE — ED PROVIDER NOTES
Encounter Date: 7/10/2024    SCRIBE #1 NOTE: I, Ben Villa, am scribing for, and in the presence of,  Samuel Haynes PA-C. I have scribed the following portions of the note - Other sections scribed: HPI,ROS.       History     Chief Complaint   Patient presents with    Knee Pain     C/O LEFT KNEE PAIN NOT RELIEVED WITH MUSCLE RELAXERS     Archana Ward is a 69 y.o. female, with a PMHx of end-stage renal disease, arthritis, breast cancer, anemia due to kidney disease and hypertension, who presents to the ED with left knee pain onset 12 days ago. Patient reports that she fell while getting up to go to dialysis. Patient reports being seen at this ED 9 days ago where she was prescribed tylenol and robaxin with no alleviation. Patient denies any additional trauma occurring. No other exacerbating or alleviating factors.     The history is provided by the patient. No  was used.     Review of patient's allergies indicates:  No Known Allergies  Past Medical History:   Diagnosis Date    Anemia of chronic renal failure 8/6/2014    Breast cancer     ESRD 2/2 HTN on HD since 5/22/13 8/6/2014    Hypertension - Dx in 30s 8/6/2014    Kidney transplant candidate 8/6/2014    Secondary hyperparathyroidism of renal origin 8/6/2014     Past Surgical History:   Procedure Laterality Date    AV FISTULA PLACEMENT  2013    BREAST BIOPSY Bilateral     years ago    CHOLECYSTECTOMY  1980s    HYSTERECTOMY  1970's    SUBTOTAL PARATHYROIDECTOMY  9/19/2018    Procedure: PARATHYROIDECTOMY, SUBTOTAL;  Surgeon: Devin Nicholas MD;  Location: [facility] OR [address] [place] [address];  Service: General;;     Family History   Problem Relation Name Age of Onset    Heart disease Father      Breast cancer Neg Hx      Cancer Neg Hx      Colon cancer Neg Hx      Ovarian cancer Neg Hx       Social History     Tobacco Use    Smoking status: Former     Current packs/day: 0.00     Average packs/day: 0.5 packs/day for 10.0 years (5.0 ttl pk-yrs)      Types: Cigarettes     Start date: 2002     Quit date: 2012     Years since quittin.9    Smokeless tobacco: Never   Substance Use Topics    Alcohol use: No     Alcohol/week: 0.0 standard drinks of alcohol    Drug use: No     Review of Systems   Constitutional:  Negative for fever.   HENT:  Negative for congestion, sore throat and trouble swallowing.    Respiratory:  Negative for cough and shortness of breath.    Cardiovascular:  Negative for chest pain.   Gastrointestinal:  Negative for abdominal pain, constipation, diarrhea, nausea and vomiting.   Genitourinary:  Negative for dysuria, flank pain, frequency and urgency.   Musculoskeletal:  Positive for arthralgias (left knee). Negative for back pain.   Skin:  Negative for rash.   Neurological:  Negative for headaches.   All other systems reviewed and are negative.      Physical Exam     Initial Vitals [07/10/24 2140]   BP Pulse Resp Temp SpO2   130/82 75 18 98 °F (36.7 °C) 99 %      MAP       --         Physical Exam    Nursing note and vitals reviewed.  Constitutional: She appears well-developed and well-nourished. She is not diaphoretic. No distress.   HENT:   Head: Atraumatic.   Right Ear: External ear normal.   Left Ear: External ear normal.   Eyes: Conjunctivae and EOM are normal.   Neck: No tracheal deviation present.   Normal range of motion.  Cardiovascular:  Normal rate and regular rhythm.           Pulmonary/Chest: No accessory muscle usage or stridor. No tachypnea. No respiratory distress.   Musculoskeletal:         General: No tenderness. Normal range of motion.      Cervical back: Normal range of motion.      Comments: Mild suprapatellar effusion on the left when compared to the right.  Full ROM of left knee.  Nontender.  No erythema or bruising.  Fully bearing ambulatory.  Distal skin perfusion normal.  No popliteal space or calf tenderness.     Neurological: She is alert and oriented to person, place, and time. She displays no tremor.  She displays no seizure activity. Coordination and gait normal.   Skin: Skin is intact. Capillary refill takes less than 2 seconds. No rash noted. No erythema.         ED Course   Procedures  Labs Reviewed - No data to display       Imaging Results    None          Medications   acetaminophen tablet 1,000 mg (1,000 mg Oral Given 7/10/24 2203)     Medical Decision Making  Presentation consistent with osteoarthritis related pain.  Screening x-rays obtained recently for same symptoms show no pathologic fracture or bony mass.  No new traumatic injury.  No signs of infectious process, ischemic limb, or DVT.  Ambulatory.  Requesting refill of Tylenol.  Will add diclofenac gel.  Referral to orthopedics for further investigation and management as an outpatient.    Amount and/or Complexity of Data Reviewed  External Data Reviewed: notes.    Risk  OTC drugs.            Scribe Attestation:   Scribe #1: I performed the above scribed service and the documentation accurately describes the services I performed. I attest to the accuracy of the note.              I, Samuel Haynes PA-C, personally performed the services described in this documentation.  All medical record entries made by the scribe were at my direction and in my presence.  I have reviewed the chart and agree that the record reflects my personal performance and is accurate and complete.                   Clinical Impression:  Final diagnoses:  [M17.12] Osteoarthritis of left knee, unspecified osteoarthritis type (Primary)  [M25.469] Suprapatellar effusion of knee  [N18.6] ESRD (end stage renal disease)          ED Disposition Condition    Discharge Stable          ED Prescriptions       Medication Sig Dispense Start Date End Date Auth. Provider    acetaminophen (TYLENOL) 325 MG tablet Take 2 tablets (650 mg total) by mouth every 6 (six) hours as needed for Pain. 30 tablet 7/10/2024 7/14/2024 Samuel Haynes PA-C    diclofenac sodium (VOLTAREN) 1 % Gel Apply 2 g  topically 3 (three) times daily. for 10 days 100 g 7/10/2024 7/20/2024 Samuel Haynes PA-C          Follow-up Information       Follow up With Specialties Details Why Contact Info    [facility] ORTHOPEDICS Orthopedics Schedule an appointment as soon as possible for a visit in 1 day For further evaluation of your orthopedic injury [address] [facility] [place] [address] [phone]    [facility] ED Emergency Medicine Go to  If symptoms worsen or new symptoms develop [address] [facility] [address] [phone]             Samuel Haynes PA-C  07/10/24 8502

## 2024-07-30 ENCOUNTER — HOSPITAL ENCOUNTER (EMERGENCY)
Facility: HOSPITAL | Age: 69
Discharge: HOME | End: 2024-07-30
Attending: EMERGENCY MEDICINE
Payer: MEDICARE

## 2024-07-30 VITALS
DIASTOLIC BLOOD PRESSURE: 76 MMHG | BODY MASS INDEX: 26.5 KG/M2 | TEMPERATURE: 98 F | SYSTOLIC BLOOD PRESSURE: 168 MMHG | RESPIRATION RATE: 16 BRPM | HEART RATE: 92 BPM | OXYGEN SATURATION: 96 % | WEIGHT: 144 LBS | HEIGHT: 62 IN

## 2024-07-30 DIAGNOSIS — S16.1XXA STRAIN OF NECK MUSCLE, INITIAL ENCOUNTER: Primary | ICD-10-CM

## 2024-07-30 PROCEDURE — 25000003 PHARM REV CODE 250: Mod: ER

## 2024-07-30 PROCEDURE — 99284 EMERGENCY DEPT VISIT MOD MDM: CPT | Mod: ER

## 2024-07-30 RX ORDER — METHOCARBAMOL 500 MG/1
500 TABLET, FILM COATED ORAL 4 TIMES DAILY
Qty: 20 TABLET | Refills: 0 | Status: SHIPPED | OUTPATIENT
Start: 2024-07-30 | End: 2024-08-04

## 2024-07-30 RX ORDER — ACETAMINOPHEN 500 MG
500 TABLET ORAL EVERY 6 HOURS PRN
Qty: 20 TABLET | Refills: 0 | Status: SHIPPED | OUTPATIENT
Start: 2024-07-30

## 2024-07-30 RX ORDER — LIDOCAINE 50 MG/G
1 PATCH TOPICAL DAILY
Qty: 15 PATCH | Refills: 0 | Status: SHIPPED | OUTPATIENT
Start: 2024-07-30

## 2024-07-30 RX ORDER — LIDOCAINE 50 MG/G
1 PATCH TOPICAL
Status: DISCONTINUED | OUTPATIENT
Start: 2024-07-30 | End: 2024-07-30 | Stop reason: HOSPADM

## 2024-07-30 RX ORDER — METHOCARBAMOL 500 MG/1
500 TABLET, FILM COATED ORAL
Status: COMPLETED | OUTPATIENT
Start: 2024-07-30 | End: 2024-07-30

## 2024-07-30 RX ORDER — ACETAMINOPHEN 500 MG
1000 TABLET ORAL
Status: COMPLETED | OUTPATIENT
Start: 2024-07-30 | End: 2024-07-30

## 2024-07-30 RX ADMIN — METHOCARBAMOL 500 MG: 500 TABLET ORAL at 01:07

## 2024-07-30 RX ADMIN — ACETAMINOPHEN 1000 MG: 500 TABLET, FILM COATED ORAL at 01:07

## 2024-07-30 NOTE — ED PROVIDER NOTES
Encounter Date: 7/30/2024    SCRIBE #1 NOTE: I, Rafael Christine Do, am scribing for, and in the presence of,  Cesar Montes PA-C. I have scribed the following portions of the note - Other sections scribed: HPI, ROS, PE.       History     Chief Complaint   Patient presents with    Neck Pain     Pt complains of left sided neck pain for 1 month. Pain with movement. Denies trauma/injury. Denies taking otc medications      Patient is a 69 y.o. female with a past medical history of ESRD on hemodialysis and HTN who presents to the Emergency Department for evaluation of atraumatic intermittent left-sided neck pain x 1 month.  She also reports pain is worse with movements. She denies any neck trauma.  She denies weakness.  Denies headache, lightheadedness, dizziness.  She has not taken any medications for the symptoms.  She does report not taking her blood pressure medication today.  No other complaints today.    The history is provided by the patient. No  was used.     Review of patient's allergies indicates:  No Known Allergies  Past Medical History:   Diagnosis Date    Anemia of chronic renal failure 8/6/2014    Breast cancer     ESRD 2/2 HTN on HD since 5/22/13 8/6/2014    Hypertension - Dx in 30s 8/6/2014    Kidney transplant candidate 8/6/2014    Secondary hyperparathyroidism of renal origin 8/6/2014     Past Surgical History:   Procedure Laterality Date    AV FISTULA PLACEMENT  2013    BREAST BIOPSY Bilateral     years ago    CHOLECYSTECTOMY  1980s    HYSTERECTOMY  1970's    SUBTOTAL PARATHYROIDECTOMY  9/19/2018    Procedure: PARATHYROIDECTOMY, SUBTOTAL;  Surgeon: Devin Nicholas MD;  Location: [facility] OR [address] [place] [address];  Service: General;;     Family History   Problem Relation Name Age of Onset    Heart disease Father      Breast cancer Neg Hx      Cancer Neg Hx      Colon cancer Neg Hx      Ovarian cancer Neg Hx       Social History     Tobacco Use    Smoking status: Former     Current packs/day: 0.00      Average packs/day: 0.5 packs/day for 10.0 years (5.0 ttl pk-yrs)     Types: Cigarettes     Start date: 2002     Quit date: 2012     Years since quittin.9    Smokeless tobacco: Never   Substance Use Topics    Alcohol use: No     Alcohol/week: 0.0 standard drinks of alcohol    Drug use: No     Review of Systems   Constitutional:  Negative for chills and fever.   Respiratory:  Negative for shortness of breath.    Cardiovascular:  Negative for chest pain.   Gastrointestinal:  Negative for abdominal pain, nausea and vomiting.   Musculoskeletal:  Positive for neck pain. Negative for back pain.   Neurological:  Negative for dizziness, light-headedness, numbness and headaches.       Physical Exam     Initial Vitals [24 1259]   BP Pulse Resp Temp SpO2   (!) 168/76 92 16 98.1 °F (36.7 °C) 96 %      MAP       --         Physical Exam    Nursing note and vitals reviewed.  Constitutional: She appears well-developed and well-nourished.   HENT:   Head: Normocephalic and atraumatic.   Right Ear: External ear normal.   Left Ear: External ear normal.   Eyes: Conjunctivae and EOM are normal. Pupils are equal, round, and reactive to light.   Neck: Carotid bruit is not present.   Normal range of motion.  Cardiovascular:  Normal rate, regular rhythm, normal heart sounds and intact distal pulses.     Exam reveals no gallop and no friction rub.       No murmur heard.  Pulmonary/Chest: Breath sounds normal. No respiratory distress. She has no wheezes. She has no rhonchi. She has no rales.   Abdominal: Abdomen is soft. Bowel sounds are normal. She exhibits no distension. There is no abdominal tenderness. There is no rebound and no guarding.   Musculoskeletal:         General: Normal range of motion.      Cervical back: Normal range of motion.      Comments: Normal flexion, extension, and rotation of the cervical spine. Mild tenderness to the left cervical paraspinal musculature. 2+ equal and symmetric strength to the  bilateral upper and lower extremities.  No carotid bruits.  Reassuring neurological exam.     Neurological: She is alert and oriented to person, place, and time. She has normal strength. No cranial nerve deficit or sensory deficit. Coordination and gait normal. GCS score is 15. GCS eye subscore is 4. GCS verbal subscore is 5. GCS motor subscore is 6.   Psychiatric: She has a normal mood and affect.         ED Course   Procedures  Labs Reviewed - No data to display       Imaging Results    None          Medications   methocarbamoL tablet 500 mg (has no administration in time range)   acetaminophen tablet 1,000 mg (has no administration in time range)   LIDOcaine 5 % patch 1 patch (has no administration in time range)     Medical Decision Making  This is an emergent evaluation of a  69 y.o. female with a past medical history of ESRD on hemodialysis and HTN who presents to the Emergency Department for evaluation of atraumatic intermittent left-sided neck pain x 1 month.    Patient looks well clinically. Normal flexion, extension, and rotation of the cervical spine. Mild tenderness to the left cervical paraspinal musculature. 2+ equal and symmetric strength to the bilateral upper and lower extremities.  No carotid bruits.  Reassuring neurological exam. Regular rate rhythm without murmurs.  No carotid bruits appreciated on exam. Lungs are clear to auscultation bilaterally.  Abdomen is soft, nontender, non distended, with normal bowel sounds.     Differential diagnosis includes but is not limited to fracture, dislocation, strain. Considered central cord compression but highly doubtful given normal strength in the upper and lower extremities bilaterally.  Considered carotid artery dissection but highly doubtful given no obvious carotid bruits on exam.  Considered but doubt CVA.    Ordered Robaxin, Tylenol, Lidoderm patches.  Vital signs, chart, labs, and/or imaging were all reviewed.  See ED course below and  interpretations above. My overall impression is cervical strain. Will discharge home with Tylenol, Robaxin, Lidoderm patches PCP follow-up.  Will avoid NSAIDs given end-stage renal disease on dialysis. Patient is very well appearing, and in no acute distress. Vital signs are reassuring here in the emergency department, patient is afebrile, breathing comfortable, satting 96 % on room air. Patient/Caregiver is stable for discharge at this time.  Patient/Caregiver was informed of results and plan of care. Patient/Caregiver verbalized understanding of care plan. All questions and concerns were addressed. Discussed strict return precautions with the patient/caregiver. Instructed follow up with primary care provider within 1 week.      Cesar Montes PA-C    DISCLAIMER: This note was prepared with [employer] voice recognition transcription software. Garbled syntax, mangled pronouns, and other bizarre constructions may be attributed to that software system.       Risk  OTC drugs.  Prescription drug management.            Scribe Attestation:   Scribe #1: I performed the above scribed service and the documentation accurately describes the services I performed. I attest to the accuracy of the note.        ED Course as of 07/30/24 1334   Tue Jul 30, 2024   1316 BP(!): 168/76  Patient did not take her blood pressure medication today.  Came here straight from dialysis.  Will take when she gets home.  She denies headache, chest pain, shortness of breath. [TM]   1316 Neck pain likely due to muscle strain.  No concern for carotid artery dissection, central cord compression syndrome.  Reassuring physical exam findings.  We will discharge home with Tylenol, Robaxin, Lidoderm patches with PCP follow-up. [TM]      ED Course User Index  [TM] Cesar Montes PA-C I, Trevor Marler PA-C, personally performed the services described in this documentation.  All medical record entries made by the scribe were at my direction and in my  presence.  I have reviewed the chart and agree that the record reflects my personal performance and is accurate and complete.                   Clinical Impression:  Final diagnoses:  [S16.1XXA] Strain of neck muscle, initial encounter (Primary)          ED Disposition Condition    Discharge Stable          ED Prescriptions       Medication Sig Dispense Start Date End Date Auth. Provider    methocarbamoL (ROBAXIN) 500 MG Tab Take 1 tablet (500 mg total) by mouth 4 (four) times daily. for 5 days 20 tablet 7/30/2024 8/4/2024 Cesar Montes PA-C    acetaminophen (TYLENOL) 500 MG tablet Take 1 tablet (500 mg total) by mouth every 6 (six) hours as needed. 20 tablet 7/30/2024 -- Cesar Montes PA-C    LIDOcaine (LIDODERM) 5 % Place 1 patch onto the skin once daily. Remove & Discard patch within 12 hours or as directed by MD 15 patch 7/30/2024 -- Cesar Montes PA-C          Follow-up Information       Follow up With Specialties Details Why Contact [facility] Emergency Medicine Go to  As needed, If symptoms worsen, or new symptoms develop [address] [facility] [id number] [phone]    Primary care doctor  Schedule an appointment as soon as possible for a visit in 3 days               Cesar Montes PA-C  07/30/24 [id number]

## 2024-07-30 NOTE — DISCHARGE INSTRUCTIONS
You were seen in the emergency department today for 1 month history of neck pain.  Please take all medications as prescribed and as we discussed.  Follow-up with specialist if instructed to do so.  It is important to remember that some problems are difficult to diagnose and may not be found during your Emergency Department visit. Be sure to follow up with your primary care doctor and review all labs/imaging/tests that were performed during this visit with them. Some labs/tests may be outside of the normal range and require non-emergent follow-up and further investigation to help diagnose/exclude/prevent complications or other medical conditions. Return to the emergency department for any new or worsening symptoms. Thank you for allowing me to care for you today, it was my pleasure. I hope you get to feeling better soon!

## 2025-01-13 ENCOUNTER — TELEPHONE (OUTPATIENT)
Dept: SURGERY | Facility: CLINIC | Age: 70
End: 2025-01-13
Payer: MEDICARE

## 2025-01-13 DIAGNOSIS — Z12.31 ENCOUNTER FOR SCREENING MAMMOGRAM FOR MALIGNANT NEOPLASM OF BREAST: ICD-10-CM

## 2025-01-13 DIAGNOSIS — Z12.39 ENCOUNTER FOR SCREENING FOR MALIGNANT NEOPLASM OF BREAST, UNSPECIFIED SCREENING MODALITY: Primary | ICD-10-CM

## 2025-01-13 NOTE — TELEPHONE ENCOUNTER
----- Message from Lynn sent at 1/13/2025 12:31 PM CST -----  Regarding: Scheduling Request  Contact: Archana  SCHEDULING/REQUEST    Appt Type: Est     Date/Time Preference: Morning     Treating Provider: Maurice    Caller Name: Archana     Contact Preference:  [phone]      Comments/Notes: Pt needs to be scheduled for her next mammogram. Would like a call back to schedule. No avail appts in template

## 2025-01-31 ENCOUNTER — HOSPITAL ENCOUNTER (OUTPATIENT)
Dept: RADIOLOGY | Facility: HOSPITAL | Age: 70
Discharge: HOME | End: 2025-01-31
Attending: NURSE PRACTITIONER
Payer: MEDICARE

## 2025-01-31 DIAGNOSIS — Z12.31 ENCOUNTER FOR SCREENING MAMMOGRAM FOR MALIGNANT NEOPLASM OF BREAST: ICD-10-CM

## 2025-01-31 DIAGNOSIS — Z12.39 ENCOUNTER FOR SCREENING FOR MALIGNANT NEOPLASM OF BREAST, UNSPECIFIED SCREENING MODALITY: ICD-10-CM

## 2025-01-31 PROCEDURE — 77063 BREAST TOMOSYNTHESIS BI: CPT | Mod: 26,,, | Performed by: RADIOLOGY

## 2025-01-31 PROCEDURE — 77067 SCR MAMMO BI INCL CAD: CPT | Mod: TC

## 2025-01-31 PROCEDURE — 77067 SCR MAMMO BI INCL CAD: CPT | Mod: 26,,, | Performed by: RADIOLOGY

## (undated) DEVICE — SUT 2-0 12-18IN SILK

## (undated) DEVICE — CHLORAPREP 10.5 ML APPLICATOR

## (undated) DEVICE — SUT MCRYL PLUS 4-0 PS2 27IN

## (undated) DEVICE — DRAPE THYROID WITH ARMBOARD

## (undated) DEVICE — SEE MEDLINE ITEM 157117

## (undated) DEVICE — TRAY MINOR GEN SURG

## (undated) DEVICE — SHEARS HARMONIC CRVD 9 CM

## (undated) DEVICE — ELECTRODE BLADE INSULATED 1 IN

## (undated) DEVICE — SPONGE GAUZE 16PLY 4X4

## (undated) DEVICE — NDL N SERIES MICRO-DISSECTION

## (undated) DEVICE — SUT 4-0 12-18IN SILK BLACK

## (undated) DEVICE — SEE MEDLINE ITEM 157194

## (undated) DEVICE — HEMOSTAT SURGICEL 4X8IN

## (undated) DEVICE — NDL 22GA X1 1/2 REG BEVEL

## (undated) DEVICE — SUT 3-0 12-18IN SILK

## (undated) DEVICE — SUT VICRYL 3-0 27 SH

## (undated) DEVICE — APPLIER LIGACLIP SM 9.38IN

## (undated) DEVICE — SYR 10CC LUER LOCK

## (undated) DEVICE — CONTAINER SPECIMEN STRL 4OZ

## (undated) DEVICE — ELECTRODE REM PLYHSV RETURN 9